# Patient Record
Sex: FEMALE | Race: WHITE | NOT HISPANIC OR LATINO | Employment: UNEMPLOYED | URBAN - METROPOLITAN AREA
[De-identification: names, ages, dates, MRNs, and addresses within clinical notes are randomized per-mention and may not be internally consistent; named-entity substitution may affect disease eponyms.]

---

## 2017-06-25 ENCOUNTER — HOSPITAL ENCOUNTER (EMERGENCY)
Facility: HOSPITAL | Age: 52
Discharge: HOME | End: 2017-06-25
Attending: EMERGENCY MEDICINE
Payer: COMMERCIAL

## 2017-06-25 VITALS
HEART RATE: 84 BPM | BODY MASS INDEX: 31.83 KG/M2 | SYSTOLIC BLOOD PRESSURE: 143 MMHG | RESPIRATION RATE: 20 BRPM | TEMPERATURE: 99 F | WEIGHT: 210 LBS | HEIGHT: 68 IN | OXYGEN SATURATION: 96 % | DIASTOLIC BLOOD PRESSURE: 89 MMHG

## 2017-06-25 DIAGNOSIS — F43.21 ADJUSTMENT DISORDER WITH DEPRESSED MOOD: ICD-10-CM

## 2017-06-25 DIAGNOSIS — F43.29 GRIEF REACTION WITH PROLONGED BEREAVEMENT: Primary | ICD-10-CM

## 2017-06-25 PROCEDURE — 99283 EMERGENCY DEPT VISIT LOW MDM: CPT | Mod: 25

## 2017-06-25 PROCEDURE — 99241 PR OFFICE CONSULTATION,LEVEL I: CPT | Mod: GT,,, | Performed by: PSYCHIATRY & NEUROLOGY

## 2017-06-25 RX ORDER — CLONAZEPAM 1 MG/1
2 TABLET ORAL 2 TIMES DAILY PRN
COMMUNITY
End: 2017-10-03 | Stop reason: SDUPTHER

## 2017-06-25 RX ORDER — SERTRALINE HYDROCHLORIDE 100 MG/1
100 TABLET, FILM COATED ORAL DAILY
COMMUNITY
End: 2017-07-18 | Stop reason: ALTCHOICE

## 2017-06-25 RX ORDER — DEXTROAMPHETAMINE SACCHARATE, AMPHETAMINE ASPARTATE MONOHYDRATE, DEXTROAMPHETAMINE SULFATE AND AMPHETAMINE SULFATE 7.5; 7.5; 7.5; 7.5 MG/1; MG/1; MG/1; MG/1
30 CAPSULE, EXTENDED RELEASE ORAL EVERY MORNING
COMMUNITY
End: 2017-07-18 | Stop reason: SDUPTHER

## 2017-06-25 NOTE — ED PROVIDER NOTES
"Encounter Date: 6/25/2017    SCRIBE #1 NOTE: I, Angelique Esposito , am scribing for, and in the presence of,  Dr. Bush . I have scribed the entire note.       History     Chief Complaint   Patient presents with    Psychiatric Evaluation     " can't stop crying " " my mother passed away on marianna DONI "    Depression         06/25/2017  5:06 PM     Chief Complaint: Psychiatric Evaluation       The patient is a 51 y.o. female with a PMHx of depression and anxiety who is presenting to the ED for psychiatric evaluation and depression. The pt endorses intermittent episodes of severe depression x 6 months s/p the death of her mother. The pt reports that she "cannot stop crying" and "feels like she doesn't want to be in pain any more". The pt confirms that she is currently psychiatric medications but denies having seen by a psychiatrist within the past 6 months. She endorses passive suicidal ideations but denies having a plan. No HI, AH, VH, or delusions. Pt denies drug or alcohol abuse. No physical complaints. No previous psychiatric commitment. No pertinent past surgical hx.           The history is provided by the patient.     Review of patient's allergies indicates:  Allergies no known allergies  Past Medical History:   Diagnosis Date    Depression      Past Surgical History:   Procedure Laterality Date    TONSILLECTOMY       No family history on file.  Social History   Substance Use Topics    Smoking status: Current Every Day Smoker    Smokeless tobacco: Not on file    Alcohol use Not on file     Review of Systems   Constitutional: Negative for fever.   HENT: Negative for sore throat.    Respiratory: Negative for shortness of breath.    Cardiovascular: Negative for chest pain.   Gastrointestinal: Negative for nausea.   Genitourinary: Negative for dysuria.   Musculoskeletal: Negative for back pain.   Skin: Negative for rash.   Neurological: Negative for weakness.   Hematological: Does not bruise/bleed easily. "   Psychiatric/Behavioral: Positive for dysphoric mood and suicidal ideas (passive).       Physical Exam     Initial Vitals [17 1558]   BP Pulse Resp Temp SpO2   (!) 143/89 84 20 98.5 °F (36.9 °C) 96 %      MAP       107         Physical Exam    Nursing note and vitals reviewed.  Constitutional: She appears well-developed and well-nourished.  Non-toxic appearance. No distress.   HENT:   Head: Normocephalic and atraumatic.   Mouth/Throat: Oropharynx is clear and moist.   Eyes: EOM are normal. Pupils are equal, round, and reactive to light.   Neck: Normal range of motion. Neck supple. No neck rigidity. No JVD present.   Cardiovascular: Normal rate, regular rhythm, normal heart sounds and intact distal pulses.   Pulmonary/Chest: Breath sounds normal. She has no wheezes. She has no rhonchi. She has no rales.   Abdominal: Soft. Bowel sounds are normal. She exhibits no distension. There is no tenderness. There is no rigidity, no rebound and no guarding.   Musculoskeletal: Normal range of motion.   Neurological: She is alert and oriented to person, place, and time. She has normal strength and normal reflexes. No cranial nerve deficit or sensory deficit. She exhibits normal muscle tone. Coordination normal. GCS eye subscore is 4. GCS verbal subscore is 5. GCS motor subscore is 6.   Skin: Skin is warm and dry.   Psychiatric: Her speech is normal. She is withdrawn. She is not actively hallucinating. She exhibits a depressed mood.   Tearful. Depressed, cries during examination.          ED Course   Procedures  Labs Reviewed - No data to display          Medical Decision Making:   History:   Old Medical Records: I decided to obtain old medical records.  Initial Assessment:   Patient presents for psychiatric evaluation for feeling depressed and crying for 6 months after her mother .  She is also had other losses such as her sister who passed away prior to that and her child moving out of the house.  She has had passive  suicidal thoughts but no active plan and she states she does not want to kill himself.  I doubt she is actively suicidal.  She is able to perform her ADLs and is not gravely disabled.  I do not think she needs a PEC but obtain psychiatric consult for further evaluation.  Tele psych evaluator patient also agrees patient can follow up with outpatient psychiatry.  Patient will contact her insurance company tomorrow to ask for an in network providers.  Return precautions discussed.  She is discharged improving acute distress with likely prolonged grief reaction.              Scribe Attestation:   Scribe #1: I performed the above scribed service and the documentation accurately describes the services I performed. I attest to the accuracy of the note.    Attending Attestation:           Physician Attestation for Scribe:  Physician Attestation Statement for Scribe #1: I, Dr. Bush , reviewed documentation, as scribed by Angelique Esposito  in my presence, and it is both accurate and complete.                 ED Course     Clinical Impression:   The primary encounter diagnosis was Grief reaction with prolonged bereavement. A diagnosis of Adjustment disorder with depressed mood was also pertinent to this visit.                           Wayne Bush MD  06/25/17 [id number]

## 2017-06-26 NOTE — CONSULTS
"Tele-Consultation to Emergency Department from Psychiatry    Please see previous notes:    Patient agreeable to consultation via telepsychiatry.    Consultation started: 2017 at 0817  The chief complaint leading to psychiatric consultation is: complicated grief  This consultation was requested by Dr. Wayne Bush, the Emergency Department attending physician.  The location of the consulting psychiatrist is [facility].  The patient location is [facility].  The patient arrived at the ED at: not known    Also present with the patient at the time of the consultation: hospital staff    Patient Identification:  Suki Galvan is a 51 y.o. female.    Patient information was obtained from patient.  Patient presented voluntarily to the Emergency Department by private vehicle.    History of Present Illness:  The patient is a 51 year old female who presents to ER on her own after having an increase in tearfulness over the last day. She notes that she is continuing to grieve the death of her mother who  David Izabella. She also grieves the death of her uncle, sister, and father who  prior to her mother. The patient notes that she will feel alone and "abandoned" as she was the caregiver of her mother. She denies any Si, active or passive currently. She notes that she has had some passive SI but has never attempted suicide. She states that she doesn't want to die and would never kill herself as she is scared to die. She also doesn't want to leave her , children and grandchildren. She notes increase in sadness over the last 2 weeks, increase in appetite with an unintentional 10lbs weight gain, depressed mood and increase in tearfulness. She is currently prescribed medications by PCP including Zoloft 100mg PO daily, Klonopin 2mg PO QHS and Adderall 40mg PO daily. She notes having previously seen a therapist, Natalie Collazo and a psychiatrist Dr. Collazo several years ago.     Psychiatric " "History:   Hospitalization: No  Medication Trials: Yes  Suicide Attempts: no  Violence: none   Depression: see HPI  Kimberlyn: none  AH's: none  Delusions: none    Review of Systems:  Negative except for sadness, depressed mood  Past Medical History:   Past Medical History:   Diagnosis Date    Depression         Seizures: none  Head trauma/l.o.c.: none  Wish to become pregnant[if female of childbearing age]: n/a    Allergies: nkda  Review of patient's allergies indicates:  Allergies no known allergies    Medications in ER: Medications - No data to display    Medications at home: Zoloft 100mg pO daily, Klonopin 2mg PO daily, Adderall 40mg PO daily    Substance Abuse History:   Alchohol: not known  Drug: not known     Legal History:   Past charges/incarcerations: none  Pending charges: none    Family Psychiatric History: not known    Social History:   History of Physical/Sexual Abuse: not known  Education: not known    Employment/Disability: unemployed   Financial: supported by   Relationship Status/Sexual Orientation:    Children: has children   Housing Status: lives with   Hinduism: not known   History: none   Recreational Activities: not known  Access to Gun: none    Current Evaluation:     Constitutional  Vitals:  Vitals:    06/25/17 1558   BP: (!) 143/89   Pulse: 84   Resp: 20   Temp: 98.5 °F (36.9 °C)   TempSrc: Oral   SpO2: 96%   Weight: 95.3 kg (210 lb)   Height: 5' 8" (1.727 m)      General:  unremarkable, age appropriate     Musculoskeletal  Muscle Strength/Tone:   moving arms normally   Gait & Station:   sitting on stretcher     Psychiatric  Level of Consciousness: alert  Orientation: oriented to person, place and time  Grooming: in hospital gown  Psychomotor Behavior: no agitation  Speech: normal in rate, rhythm and volume  Language: uses words appropriately  Mood: sad, depressed  Affect: congruent  Thought Process: linear  Associations: goal directed  Thought Content: no Si, " active or passive currently, no HI/AVH  Memory: intact  Attention: intact to interview  Fund of Knowledge: appears adequate  Insight: good   Judgement: good    Relevant Elements of Neurological Exam: no abnormality of posture noted    Assessment - Diagnosis - Goals:     Diagnosis/Impression: The patient presents with a history of depression. She also likely has an adjustment disorder with depessed mood in addition to a history of deprssion as she is having a difficult time processing and dealing with the death of her mother. She denies any current Si, active or passive. She has had some occasional fleeting passive Si. She denies any plan and denies ever making any suicide attempts. She identifies wanting to live for herself and family and notes that she is scared to die. She doesn't appear to be an imminent risk of danger to herself/others. She is not having any HI and thus isn't a risk of danger to others. She is maintaining ADL's, bathing, eating, hygiene, etc and thus she is not gravely disabled. There are no grounds to place her on a PEC. She can be discharged from a psychiatric stand and should have some outpatient follow up. She can benefit both from a psychiatrist but also from a talk therapist as she needs to process her grief feelings.     Rec: 1. Patient doesn't need hospitalization. 2. Can follow outpatient with a psychiatrist and therapist-if she is not already connected, should look to call insurance company to find out who is in network. She should continue to follow with her PCP for psychotropic medications for now.     Time with patient: 15 minutes     More than 50% of the time was spent counseling/coordinating care    Laboratory Data: Labs Reviewed - No data to display      Consulting clinician was informed of the encounter and consult note.    Consultation ended: 6/25/2017 at 9:05pm

## 2017-06-26 NOTE — ED NOTES
Pt crying while talking about all the family members she has lost over the last 6 years. Pt is aware she is waiting for a tele-psych consult.

## 2017-07-18 ENCOUNTER — OFFICE VISIT (OUTPATIENT)
Dept: FAMILY MEDICINE | Facility: CLINIC | Age: 52
End: 2017-07-18
Payer: COMMERCIAL

## 2017-07-18 VITALS
DIASTOLIC BLOOD PRESSURE: 74 MMHG | RESPIRATION RATE: 18 BRPM | HEIGHT: 68 IN | TEMPERATURE: 99 F | BODY MASS INDEX: 31.85 KG/M2 | WEIGHT: 210.13 LBS | HEART RATE: 78 BPM | SYSTOLIC BLOOD PRESSURE: 128 MMHG | OXYGEN SATURATION: 97 %

## 2017-07-18 DIAGNOSIS — F32.89 OTHER DEPRESSION: ICD-10-CM

## 2017-07-18 DIAGNOSIS — R73.9 HYPERGLYCEMIA: Primary | ICD-10-CM

## 2017-07-18 DIAGNOSIS — F17.200 SMOKING: ICD-10-CM

## 2017-07-18 DIAGNOSIS — F43.21 GRIEF REACTION: ICD-10-CM

## 2017-07-18 LAB — GLUCOSE SERPL-MCNC: 93 MG/DL (ref 70–110)

## 2017-07-18 PROCEDURE — 99999 PR PBB SHADOW E&M-EST. PATIENT-LVL IV: CPT | Mod: PBBFAC,,, | Performed by: NURSE PRACTITIONER

## 2017-07-18 PROCEDURE — 99204 OFFICE O/P NEW MOD 45 MIN: CPT | Mod: S$GLB,,, | Performed by: NURSE PRACTITIONER

## 2017-07-18 PROCEDURE — 82948 REAGENT STRIP/BLOOD GLUCOSE: CPT | Mod: S$GLB,,, | Performed by: NURSE PRACTITIONER

## 2017-07-18 RX ORDER — DEXTROAMPHETAMINE SACCHARATE, AMPHETAMINE ASPARTATE MONOHYDRATE, DEXTROAMPHETAMINE SULFATE AND AMPHETAMINE SULFATE 5; 5; 5; 5 MG/1; MG/1; MG/1; MG/1
20 CAPSULE, EXTENDED RELEASE ORAL
COMMUNITY
End: 2017-10-03

## 2017-07-18 RX ORDER — UBIDECARENONE 75 MG
500 CAPSULE ORAL DAILY
COMMUNITY

## 2017-07-18 RX ORDER — SERTRALINE HYDROCHLORIDE 100 MG/1
200 TABLET, FILM COATED ORAL DAILY
COMMUNITY
End: 2017-10-03 | Stop reason: SDUPTHER

## 2017-07-18 NOTE — PROGRESS NOTES
Subjective:       Patient ID: Suki Galvan is a 51 y.o. female.    Chief Complaint: Hyperglycemia (Patient states that she has been monitoring blood sugars at home and reports it is running about 190 (brought home monitoring log at home). Believes that she may have diabetes. )  This is her first time being seen here in the clinic.  HPI   She is her today with complaints of high sugar level.  Vitals:    07/18/17 1339   BP: 128/74   Pulse: 78   Resp: 18   Temp: 98.8 °F (37.1 °C)     Past Medical History:   Diagnosis Date    Anxiety     Arthritis     Depression     28 years    Kidney stones     Multiple thyroid nodules    Below are her home glucose readings        Review of Systems    Psychiatry appt in August 15th on [facility]  Objective:      Physical Exam   Constitutional: She is oriented to person, place, and time. Vital signs are normal. She appears well-developed and well-nourished.   HENT:   Head: Normocephalic and atraumatic.   Right Ear: Hearing, tympanic membrane, external ear and ear canal normal.   Left Ear: Hearing, tympanic membrane, external ear and ear canal normal.   Nose: Nose normal.   Mouth/Throat: Uvula is midline, oropharynx is clear and moist and mucous membranes are normal.   Eyes: Lids are normal.   Neck: Normal range of motion. Neck supple.   Cardiovascular: Normal rate, regular rhythm and normal heart sounds.    Pulmonary/Chest: Effort normal and breath sounds normal.   Abdominal: Soft. Bowel sounds are normal. There is no tenderness.   Musculoskeletal: Normal range of motion.   Lymphadenopathy:        Head (right side): No submental, no submandibular, no tonsillar, no preauricular, no posterior auricular and no occipital adenopathy present.        Head (left side): No submental, no submandibular, no tonsillar, no preauricular, no posterior auricular and no occipital adenopathy present.   Neurological: She is alert and oriented to person, place, and time.   Skin: Skin is warm, dry and  intact.   Psychiatric: She has a normal mood and affect. Her speech is normal and behavior is normal. Judgment and thought content normal. Cognition and memory are normal.   Nursing note and vitals reviewed.      Assessment & Plan:       Hyperglycemia  -     POCT Glucose  -     Hemoglobin A1c; Future; Expected date: 07/18/2017  -     CBC auto differential; Future; Expected date: 07/18/2017  -     Comprehensive metabolic panel; Future; Expected date: 07/18/2017  -     Lipid panel; Future; Expected date: 07/18/2017    Other depression  -     TSH; Future; Expected date: 07/18/2017  -     T3, free; Future; Expected date: 07/18/2017  -     T4, free; Future; Expected date: 07/18/2017  -     CBC auto differential; Future; Expected date: 07/18/2017  -     Comprehensive metabolic panel; Future; Expected date: 07/18/2017  -     Vitamin B12; Future; Expected date: 07/18/2017    Grief reaction  -     Vitamin B12; Future; Expected date: 07/18/2017    Smoking    I have completed her medical, surgical, family and social history      Return if symptoms worsen or fail to improve.

## 2017-07-21 LAB
ALBUMIN SERPL-MCNC: 4 G/DL (ref 3.6–5.1)
ALBUMIN/GLOB SERPL: 1.5 (CALC) (ref 1–2.5)
ALP SERPL-CCNC: 85 U/L (ref 33–130)
ALT SERPL-CCNC: 14 U/L (ref 6–29)
AST SERPL-CCNC: 18 U/L (ref 10–35)
BASOPHILS # BLD AUTO: 48 CELLS/UL (ref 0–200)
BASOPHILS NFR BLD AUTO: 0.7 %
BILIRUB SERPL-MCNC: 0.5 MG/DL (ref 0.2–1.2)
BUN SERPL-MCNC: 13 MG/DL (ref 7–25)
BUN/CREAT SERPL: ABNORMAL (CALC) (ref 6–22)
CALCIUM SERPL-MCNC: 9.5 MG/DL (ref 8.6–10.4)
CHLORIDE SERPL-SCNC: 107 MMOL/L (ref 98–110)
CHOLEST SERPL-MCNC: 247 MG/DL (ref 125–200)
CHOLEST/HDLC SERPL: 3.6 (CALC)
CO2 SERPL-SCNC: 30 MMOL/L (ref 20–31)
CREAT SERPL-MCNC: 0.75 MG/DL (ref 0.5–1.05)
EOSINOPHIL # BLD AUTO: 455 CELLS/UL (ref 15–500)
EOSINOPHIL NFR BLD AUTO: 6.6 %
ERYTHROCYTE [DISTWIDTH] IN BLOOD BY AUTOMATED COUNT: 12.3 % (ref 11–15)
GFR SERPL CREATININE-BSD FRML MDRD: 91 ML/MIN/1.73M2
GLOBULIN SER CALC-MCNC: 2.7 G/DL (CALC) (ref 1.9–3.7)
GLUCOSE SERPL-MCNC: 103 MG/DL (ref 65–99)
HBA1C MFR BLD: 5.6 % OF TOTAL HGB
HCT VFR BLD AUTO: 43.6 % (ref 35–45)
HDLC SERPL-MCNC: 68 MG/DL
HGB BLD-MCNC: 14 G/DL (ref 11.7–15.5)
LDLC SERPL CALC-MCNC: 165 MG/DL (CALC)
LYMPHOCYTES # BLD AUTO: 1670 CELLS/UL (ref 850–3900)
LYMPHOCYTES NFR BLD AUTO: 24.2 %
MCH RBC QN AUTO: 29.2 PG (ref 27–33)
MCHC RBC AUTO-ENTMCNC: 32.1 G/DL (ref 32–36)
MCV RBC AUTO: 90.8 FL (ref 80–100)
MONOCYTES # BLD AUTO: 683 CELLS/UL (ref 200–950)
MONOCYTES NFR BLD AUTO: 9.9 %
NEUTROPHILS # BLD AUTO: 4043 CELLS/UL (ref 1500–7800)
NEUTROPHILS NFR BLD AUTO: 58.6 %
NONHDLC SERPL-MCNC: 179 MG/DL (CALC)
PLATELET # BLD AUTO: 361 THOUSAND/UL (ref 140–400)
PMV BLD REES-ECKER: 10.7 FL (ref 7.5–12.5)
POTASSIUM SERPL-SCNC: 4.9 MMOL/L (ref 3.5–5.3)
PROT SERPL-MCNC: 6.7 G/DL (ref 6.1–8.1)
RBC # BLD AUTO: 4.8 MILLION/UL (ref 3.8–5.1)
SODIUM SERPL-SCNC: 139 MMOL/L (ref 135–146)
T3FREE SERPL-MCNC: 2.9 PG/ML (ref 2.3–4.2)
T4 FREE SERPL-MCNC: 1 NG/DL (ref 0.8–1.8)
TRIGL SERPL-MCNC: 70 MG/DL
TSH SERPL-ACNC: 2.18 MIU/L
VIT B12 SERPL-MCNC: 295 PG/ML (ref 200–1100)
WBC # BLD AUTO: 6.9 THOUSAND/UL (ref 3.8–10.8)

## 2017-07-25 ENCOUNTER — PATIENT MESSAGE (OUTPATIENT)
Dept: FAMILY MEDICINE | Facility: CLINIC | Age: 52
End: 2017-07-25

## 2017-08-14 ENCOUNTER — PATIENT MESSAGE (OUTPATIENT)
Dept: FAMILY MEDICINE | Facility: CLINIC | Age: 52
End: 2017-08-14

## 2017-10-03 ENCOUNTER — OFFICE VISIT (OUTPATIENT)
Dept: PSYCHIATRY | Facility: CLINIC | Age: 52
End: 2017-10-03
Payer: COMMERCIAL

## 2017-10-03 VITALS
WEIGHT: 212 LBS | HEIGHT: 68 IN | DIASTOLIC BLOOD PRESSURE: 70 MMHG | BODY MASS INDEX: 32.13 KG/M2 | HEART RATE: 64 BPM | SYSTOLIC BLOOD PRESSURE: 147 MMHG

## 2017-10-03 DIAGNOSIS — F33.1 MODERATE EPISODE OF RECURRENT MAJOR DEPRESSIVE DISORDER: Primary | ICD-10-CM

## 2017-10-03 DIAGNOSIS — F41.1 GAD (GENERALIZED ANXIETY DISORDER): ICD-10-CM

## 2017-10-03 PROCEDURE — 99999 PR PBB SHADOW E&M-EST. PATIENT-LVL III: CPT | Mod: PBBFAC,,, | Performed by: PSYCHIATRY & NEUROLOGY

## 2017-10-03 PROCEDURE — 99204 OFFICE O/P NEW MOD 45 MIN: CPT | Mod: S$GLB,,, | Performed by: PSYCHIATRY & NEUROLOGY

## 2017-10-03 RX ORDER — DEXTROAMPHETAMINE SACCHARATE, AMPHETAMINE ASPARTATE, DEXTROAMPHETAMINE SULFATE AND AMPHETAMINE SULFATE 5; 5; 5; 5 MG/1; MG/1; MG/1; MG/1
1 TABLET ORAL 2 TIMES DAILY
Qty: 60 TABLET | Refills: 0 | Status: SHIPPED | OUTPATIENT
Start: 2017-10-31 | End: 2018-02-26 | Stop reason: SDUPTHER

## 2017-10-03 RX ORDER — DEXTROAMPHETAMINE SACCHARATE, AMPHETAMINE ASPARTATE, DEXTROAMPHETAMINE SULFATE AND AMPHETAMINE SULFATE 5; 5; 5; 5 MG/1; MG/1; MG/1; MG/1
1 TABLET ORAL 2 TIMES DAILY
Qty: 60 TABLET | Refills: 0 | Status: SHIPPED | OUTPATIENT
Start: 2017-10-03 | End: 2017-10-03 | Stop reason: SDUPTHER

## 2017-10-03 RX ORDER — CLONAZEPAM 2 MG/1
2 TABLET ORAL DAILY PRN
Qty: 30 TABLET | Refills: 2 | Status: SHIPPED | OUTPATIENT
Start: 2017-10-03 | End: 2018-02-26 | Stop reason: SDUPTHER

## 2017-10-03 RX ORDER — SERTRALINE HYDROCHLORIDE 100 MG/1
200 TABLET, FILM COATED ORAL DAILY
Qty: 30 TABLET | Refills: 2 | Status: SHIPPED | OUTPATIENT
Start: 2017-10-03 | End: 2018-05-28 | Stop reason: SDUPTHER
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
## 2017-10-12 ENCOUNTER — DOCUMENTATION ONLY (OUTPATIENT)
Dept: FAMILY MEDICINE | Facility: CLINIC | Age: 52
End: 2017-10-12

## 2017-10-12 NOTE — PROGRESS NOTES
Left several message over the last few days for patient to call and reschedule her appointment on 10-20.  is out of the office.

## 2017-10-26 DIAGNOSIS — Z12.39 SCREENING FOR BREAST CANCER: ICD-10-CM

## 2017-10-26 DIAGNOSIS — Z12.11 COLON CANCER SCREENING: ICD-10-CM

## 2017-10-26 DIAGNOSIS — Z11.59 NEED FOR HEPATITIS C SCREENING TEST: Primary | ICD-10-CM

## 2017-10-27 ENCOUNTER — DOCUMENTATION ONLY (OUTPATIENT)
Dept: FAMILY MEDICINE | Facility: CLINIC | Age: 52
End: 2017-10-27

## 2017-10-30 ENCOUNTER — OFFICE VISIT (OUTPATIENT)
Dept: FAMILY MEDICINE | Facility: CLINIC | Age: 52
End: 2017-10-30
Payer: COMMERCIAL

## 2017-10-30 VITALS
WEIGHT: 214.5 LBS | DIASTOLIC BLOOD PRESSURE: 76 MMHG | HEART RATE: 82 BPM | HEIGHT: 68 IN | SYSTOLIC BLOOD PRESSURE: 130 MMHG | TEMPERATURE: 99 F | BODY MASS INDEX: 32.51 KG/M2

## 2017-10-30 DIAGNOSIS — R73.9 HYPERGLYCEMIA: Primary | ICD-10-CM

## 2017-10-30 DIAGNOSIS — G47.419 PRIMARY NARCOLEPSY WITHOUT CATAPLEXY: ICD-10-CM

## 2017-10-30 DIAGNOSIS — Z12.4 PAP SMEAR FOR CERVICAL CANCER SCREENING: ICD-10-CM

## 2017-10-30 DIAGNOSIS — G47.00 INSOMNIA, UNSPECIFIED TYPE: ICD-10-CM

## 2017-10-30 DIAGNOSIS — E78.5 HYPERLIPIDEMIA, UNSPECIFIED HYPERLIPIDEMIA TYPE: ICD-10-CM

## 2017-10-30 DIAGNOSIS — F17.200 SMOKING: ICD-10-CM

## 2017-10-30 DIAGNOSIS — Z12.11 COLON CANCER SCREENING: ICD-10-CM

## 2017-10-30 DIAGNOSIS — E04.1 THYROID NODULE: ICD-10-CM

## 2017-10-30 DIAGNOSIS — Z12.31 ENCOUNTER FOR SCREENING MAMMOGRAM FOR MALIGNANT NEOPLASM OF BREAST: ICD-10-CM

## 2017-10-30 DIAGNOSIS — F43.21 GRIEF REACTION: ICD-10-CM

## 2017-10-30 DIAGNOSIS — Z11.59 NEED FOR HEPATITIS C SCREENING TEST: ICD-10-CM

## 2017-10-30 DIAGNOSIS — Z83.49 FAMILY HISTORY OF HASHIMOTO THYROIDITIS: ICD-10-CM

## 2017-10-30 PROCEDURE — 88175 CYTOPATH C/V AUTO FLUID REDO: CPT

## 2017-10-30 PROCEDURE — 99214 OFFICE O/P EST MOD 30 MIN: CPT | Mod: S$GLB,,, | Performed by: FAMILY MEDICINE

## 2017-10-30 PROCEDURE — 99999 PR PBB SHADOW E&M-EST. PATIENT-LVL IV: CPT | Mod: PBBFAC,,, | Performed by: FAMILY MEDICINE

## 2017-10-30 RX ORDER — TRAZODONE HYDROCHLORIDE 50 MG/1
50 TABLET ORAL NIGHTLY
Qty: 30 TABLET | Refills: 5 | Status: SHIPPED | OUTPATIENT
Start: 2017-10-30 | End: 2018-04-03 | Stop reason: SDUPTHER

## 2017-11-01 ENCOUNTER — HOSPITAL ENCOUNTER (OUTPATIENT)
Dept: RADIOLOGY | Facility: CLINIC | Age: 52
Discharge: HOME | End: 2017-11-01
Attending: FAMILY MEDICINE
Payer: COMMERCIAL

## 2017-11-01 DIAGNOSIS — Z12.31 ENCOUNTER FOR SCREENING MAMMOGRAM FOR MALIGNANT NEOPLASM OF BREAST: ICD-10-CM

## 2017-11-01 PROCEDURE — 76536 US EXAM OF HEAD AND NECK: CPT | Mod: 26,,, | Performed by: RADIOLOGY

## 2017-11-01 PROCEDURE — 76536 US EXAM OF HEAD AND NECK: CPT | Mod: TC,PO

## 2017-11-01 NOTE — PROGRESS NOTES
Subjective:       Patient ID: Suki Galvan is a 52 y.o. female.    Chief Complaint: Establish Care    Patient Active Problem List   Diagnosis    Adjustment disorder with depressed mood    CASSIE (generalized anxiety disorder)    Moderate episode of recurrent major depressive disorder    Hyperlipidemia    Hyperglycemia    Smoking    Grief reaction    Narcolepsy   H/o thyroid nodules  By u/s but no f/u in years.  Has family h/o hashimoto    Takes adderall for narcolepsy diagnosed 10 years ago.  Also helps depression sx.  Under care of psych.  Has insomnia for which she takes klonopin.  Grief reaction prolonged  HPI  Review of Systems   Constitutional: Positive for activity change and unexpected weight change.   HENT: Negative for hearing loss, rhinorrhea and trouble swallowing.    Eyes: Negative for discharge and visual disturbance.   Respiratory: Negative for chest tightness and wheezing.    Cardiovascular: Negative for chest pain and palpitations.   Gastrointestinal: Positive for constipation. Negative for blood in stool, diarrhea and vomiting.   Endocrine: Negative for polydipsia and polyuria.   Genitourinary: Negative for difficulty urinating, dysuria, hematuria and menstrual problem.   Musculoskeletal: Positive for arthralgias. Negative for joint swelling and neck pain.   Neurological: Positive for weakness and headaches.   Psychiatric/Behavioral: Positive for dysphoric mood. Negative for confusion.       Objective:      Physical Exam   Constitutional: She is oriented to person, place, and time. She appears well-developed and well-nourished.   Neck: No thyromegaly present.   Cardiovascular: Normal rate, regular rhythm and normal heart sounds.    Pulmonary/Chest: Effort normal and breath sounds normal. Right breast exhibits no inverted nipple, no mass, no nipple discharge, no skin change and no tenderness. Left breast exhibits no inverted nipple, no mass, no nipple discharge, no skin change and no  tenderness. Breasts are symmetrical. There is no breast swelling.   Abdominal: Soft. Bowel sounds are normal. She exhibits no distension. There is no tenderness.   Genitourinary: Vagina normal and uterus normal. No breast tenderness, discharge or bleeding. Pelvic exam was performed with patient supine. No labial fusion. There is no rash, tenderness, lesion or injury on the right labia. There is no rash, tenderness, lesion or injury on the left labia. Cervix exhibits no motion tenderness, no discharge and no friability. Right adnexum displays no mass, no tenderness and no fullness. Left adnexum displays no mass, no tenderness and no fullness. No vaginal discharge found.   Musculoskeletal: She exhibits no edema.   Neurological: She is alert and oriented to person, place, and time.   Skin: Skin is warm and dry.   Psychiatric: She has a normal mood and affect.   Nursing note and vitals reviewed.      Assessment:       1. Hyperglycemia    2. Grief reaction    3. Smoking    4. Hyperlipidemia, unspecified hyperlipidemia type    5. Family history of Hashimoto thyroiditis    6. Thyroid nodule    7. Encounter for screening mammogram for malignant neoplasm of breast    8. Pap smear for cervical cancer screening    9. Need for hepatitis C screening test    10. Insomnia, unspecified type    11. Colon cancer screening    12. Primary narcolepsy without cataplexy        Plan:       1. Hyperglycemia  Screen and treat as indicated:      2. Grief reaction  Cont psych care and meds    3. Smoking  Patient counseled on smoking cessation. I discussed options such as nicotine replacement products, wellbutrin, and chantix.  Side effects, benefits and risks were discussed regarding each.  Printed materials were given.  I offered a referral to [facility] smoking cessation program.  All questions were answered.      4. Hyperlipidemia, unspecified hyperlipidemia type  Stable condition.  Continue current medications.  Will adjust based on lab  findings or if condition changes.    - Lipid panel; Future  - Comprehensive metabolic panel; Future  - Lipid panel  - Comprehensive metabolic panel    5. Family history of Hashimoto thyroiditis  Screen and treat as indicated:    - THYROID PEROXIDASE ANTIBODY; Future  - THYROID STIMULATING IMMUNOGLOBULIN; Future  - THYROID PEROXIDASE ANTIBODY  - THYROID STIMULATING IMMUNOGLOBULIN    6. Thyroid nodule  Screen and treat as indicated:    - TSH; Future  - T3, free; Future  - T4, free; Future  - US Soft Tissue Head Neck Thyroid; Future  - TSH  - T3, free  - T4, free  - US Soft Tissue Head Neck Thyroid    7. Encounter for screening mammogram for malignant neoplasm of breast  Screen and treat as indicated:    - Mammo Digital Screening Bilateral With CAD; Future    8. Pap smear for cervical cancer screening  Screen and treat as indicated:    - Liquid-based pap smear, screening    9. Need for hepatitis C screening test  Screen and treat as indicated:    - Hepatitis C antibody; Future  - Hepatitis C antibody    10. Insomnia, unspecified type  Patient was counseled on improving insomnia with these home care guidelines:  1. Review your medicines with your doctor or pharmacist to find out if they can cause insomnia.  2. Caffeine, smoking and alcohol also affect sleep. Limit your daily use and do not use these before bedtime. Alcohol may make you sleepy at first, but as its effects wear off, you may awaken a few hours later and have trouble returning to sleep.  3. Do not exercise, eat or drink large amounts of liquid within 2 hours of your bedtime.  4. Improve your sleep habits. Have a fixed bed and wake-up time. Try to keep noise, light and heat in your bedroom at a comfortable level. Try using earplugs or eyeshades if needed.   5. Avoid watching TV in bed.  6. If you do not fall asleep within 30 minutes, try to relax by reading or listening to soft music.  7. Limit daytime napping to one 30 minute period, early in the  day.  8. Get regular exercise. Find other ways to lessen your stress level.  9. If a medicine was prescribed to help reset your sleep patterns, take it as directed. Sleeping pills are intended for short-term use, only. If taken for too long, the effect wears off while the risk of physical addiction and psychological dependence increases.    Add:  - traZODone (DESYREL) 50 MG tablet; Take 1 tablet (50 mg total) by mouth every evening.  Dispense: 30 tablet; Refill: 5  Wean klonopin to 1/2 2mg po qhs prn and wean down more as able    11. Colon cancer screening  Refer for screening colonoscopy  - Ambulatory referral to Gastroenterology    12. Primary narcolepsy without cataplexy  Controlled on current medications.  Continue current medications.    Reeval 3 months or sooner prn

## 2017-11-04 LAB
ALBUMIN SERPL-MCNC: 4.1 G/DL (ref 3.6–5.1)
ALBUMIN/GLOB SERPL: 1.5 (CALC) (ref 1–2.5)
ALP SERPL-CCNC: 95 U/L (ref 33–130)
ALT SERPL-CCNC: 12 U/L (ref 6–29)
AST SERPL-CCNC: 15 U/L (ref 10–35)
BILIRUB SERPL-MCNC: 0.4 MG/DL (ref 0.2–1.2)
BUN SERPL-MCNC: 14 MG/DL (ref 7–25)
BUN/CREAT SERPL: ABNORMAL (CALC) (ref 6–22)
CALCIUM SERPL-MCNC: 9.2 MG/DL (ref 8.6–10.4)
CHLORIDE SERPL-SCNC: 104 MMOL/L (ref 98–110)
CHOLEST SERPL-MCNC: 259 MG/DL
CHOLEST/HDLC SERPL: 3.6 (CALC)
CO2 SERPL-SCNC: 27 MMOL/L (ref 20–31)
CREAT SERPL-MCNC: 0.79 MG/DL (ref 0.5–1.05)
GFR SERPL CREATININE-BSD FRML MDRD: 85 ML/MIN/1.73M2
GLOBULIN SER CALC-MCNC: 2.8 G/DL (CALC) (ref 1.9–3.7)
GLUCOSE SERPL-MCNC: 101 MG/DL (ref 65–99)
HCV AB S/CO SERPL IA: 0.02
HCV AB SERPL QL IA: NORMAL
HDLC SERPL-MCNC: 71 MG/DL
LDLC SERPL CALC-MCNC: 168 MG/DL (CALC)
NONHDLC SERPL-MCNC: 188 MG/DL (CALC)
POTASSIUM SERPL-SCNC: 4.4 MMOL/L (ref 3.5–5.3)
PROT SERPL-MCNC: 6.9 G/DL (ref 6.1–8.1)
SODIUM SERPL-SCNC: 139 MMOL/L (ref 135–146)
T3FREE SERPL-MCNC: 2.6 PG/ML (ref 2.3–4.2)
T4 FREE SERPL-MCNC: 0.9 NG/DL (ref 0.8–1.8)
THYROPEROXIDASE AB SERPL-ACNC: 1 IU/ML
TRIGL SERPL-MCNC: 93 MG/DL
TSH SERPL-ACNC: 2.46 MIU/L
TSI SER-ACNC: <89 % BASELINE

## 2017-11-07 ENCOUNTER — TELEPHONE (OUTPATIENT)
Dept: FAMILY MEDICINE | Facility: CLINIC | Age: 52
End: 2017-11-07

## 2017-11-07 ENCOUNTER — PATIENT MESSAGE (OUTPATIENT)
Dept: FAMILY MEDICINE | Facility: CLINIC | Age: 52
End: 2017-11-07

## 2017-11-10 NOTE — TELEPHONE ENCOUNTER
Your labs are all essentially in the normal range including: thyroid, liver, kidney and blood sugar.  Your hepatitis C screening is negative.  Your thyroid antibody test is negative (so no evidence of Hashimoto's autoimmune thyroiditis).        Your cholesterol is high.  Here are the results of the last cholesterol checks:   Lab Results   Component Value Date    CHOL 259 (H) 11/02/2017    CHOL 247 (H) 07/20/2017     Lab Results   Component Value Date    HDL 71 11/02/2017    HDL 68 07/20/2017     Lab Results   Component Value Date    LDLCALC 168 (H) 11/02/2017    LDLCALC 165 (H) 07/20/2017     Lab Results   Component Value Date    TRIG 93 11/02/2017    TRIG 70 07/20/2017     Lab Results   Component Value Date    CHOLHDL 3.6 11/02/2017    CHOLHDL 3.6 07/20/2017       Your 10 year risk of having a heart attack or a stroke is low:The 10-year ASCVD risk score (Korey SAMANIEGO Jr., et al., 2013) is: 4.8%    Values used to calculate the score:      Age: 52 years      Sex: Female      Is Non- : No      Diabetic: No      Tobacco smoker: Yes      Systolic Blood Pressure: 130 mmHg      Is BP treated: No      HDL Cholesterol: 71 mg/dL      Total Cholesterol: 259 mg/dL    I recommend a heart healthy diet rich in fiber, fresh vegetables and fruit and low in saturated fats (fried foods, red meat, etc.).  I also recommend regular exercise including a minimum of 150 minutes of cardio exercise per week and 2-30 minute workouts of strength training like light weights, yoga, pilates, etc.  You should work toward a body mass index of < 25.

## 2017-11-13 ENCOUNTER — PATIENT MESSAGE (OUTPATIENT)
Dept: FAMILY MEDICINE | Facility: CLINIC | Age: 52
End: 2017-11-13

## 2017-11-27 ENCOUNTER — PATIENT MESSAGE (OUTPATIENT)
Dept: FAMILY MEDICINE | Facility: CLINIC | Age: 52
End: 2017-11-27

## 2017-11-27 DIAGNOSIS — E66.09 CLASS 1 OBESITY DUE TO EXCESS CALORIES WITH SERIOUS COMORBIDITY AND BODY MASS INDEX (BMI) OF 32.0 TO 32.9 IN ADULT: Chronic | ICD-10-CM

## 2017-11-27 DIAGNOSIS — R73.9 HYPERGLYCEMIA: Primary | ICD-10-CM

## 2017-11-27 DIAGNOSIS — E78.2 MIXED HYPERLIPIDEMIA: ICD-10-CM

## 2017-11-27 DIAGNOSIS — E04.2 MULTIPLE THYROID NODULES: Chronic | ICD-10-CM

## 2017-11-27 PROBLEM — E66.811 CLASS 1 OBESITY DUE TO EXCESS CALORIES WITH SERIOUS COMORBIDITY AND BODY MASS INDEX (BMI) OF 32.0 TO 32.9 IN ADULT: Chronic | Status: ACTIVE | Noted: 2017-11-27

## 2017-11-28 NOTE — PROGRESS NOTES
STAFF NOTE:  Patient's case was formally presented to me by Dr. Garrido and patient was seen by me in supervision on the same day.  I agree with his assessment and plan as specified to try Zoloft with Adderall for management of depressive Sx, as well as use of Klonopin prn for anxiety.

## 2018-02-07 ENCOUNTER — PATIENT MESSAGE (OUTPATIENT)
Dept: FAMILY MEDICINE | Facility: CLINIC | Age: 53
End: 2018-02-07

## 2018-02-23 ENCOUNTER — DOCUMENTATION ONLY (OUTPATIENT)
Dept: FAMILY MEDICINE | Facility: CLINIC | Age: 53
End: 2018-02-23

## 2018-02-23 NOTE — PROGRESS NOTES
Pre-Visit Chart Review  For Appointment Scheduled on 2-26-18    Health Maintenance Due   Topic Date Due    Mammogram  09/30/2003    Colonoscopy  09/30/2013

## 2018-02-26 ENCOUNTER — OFFICE VISIT (OUTPATIENT)
Dept: FAMILY MEDICINE | Facility: CLINIC | Age: 53
End: 2018-02-26
Payer: COMMERCIAL

## 2018-02-26 ENCOUNTER — HOSPITAL ENCOUNTER (OUTPATIENT)
Dept: RADIOLOGY | Facility: CLINIC | Age: 53
Discharge: HOME | End: 2018-02-26
Attending: FAMILY MEDICINE
Payer: COMMERCIAL

## 2018-02-26 VITALS
WEIGHT: 216.25 LBS | DIASTOLIC BLOOD PRESSURE: 80 MMHG | TEMPERATURE: 99 F | HEART RATE: 65 BPM | BODY MASS INDEX: 32.77 KG/M2 | HEIGHT: 68 IN | SYSTOLIC BLOOD PRESSURE: 112 MMHG

## 2018-02-26 DIAGNOSIS — Z12.11 COLON CANCER SCREENING: ICD-10-CM

## 2018-02-26 DIAGNOSIS — M79.645 CHRONIC PAIN OF LEFT THUMB: ICD-10-CM

## 2018-02-26 DIAGNOSIS — Z12.31 ENCOUNTER FOR SCREENING MAMMOGRAM FOR MALIGNANT NEOPLASM OF BREAST: ICD-10-CM

## 2018-02-26 DIAGNOSIS — G47.419 PRIMARY NARCOLEPSY WITHOUT CATAPLEXY: Primary | ICD-10-CM

## 2018-02-26 DIAGNOSIS — E66.09 CLASS 1 OBESITY DUE TO EXCESS CALORIES WITH SERIOUS COMORBIDITY AND BODY MASS INDEX (BMI) OF 32.0 TO 32.9 IN ADULT: Chronic | ICD-10-CM

## 2018-02-26 DIAGNOSIS — J45.20 MILD INTERMITTENT ASTHMA WITHOUT COMPLICATION: ICD-10-CM

## 2018-02-26 DIAGNOSIS — F33.1 MODERATE EPISODE OF RECURRENT MAJOR DEPRESSIVE DISORDER: ICD-10-CM

## 2018-02-26 DIAGNOSIS — Z72.0 TOBACCO ABUSE: ICD-10-CM

## 2018-02-26 DIAGNOSIS — G89.29 CHRONIC PAIN OF LEFT THUMB: ICD-10-CM

## 2018-02-26 DIAGNOSIS — J30.1 SEASONAL ALLERGIC RHINITIS DUE TO POLLEN, UNSPECIFIED CHRONICITY: ICD-10-CM

## 2018-02-26 DIAGNOSIS — F41.1 GAD (GENERALIZED ANXIETY DISORDER): ICD-10-CM

## 2018-02-26 PROCEDURE — 3008F BODY MASS INDEX DOCD: CPT | Mod: S$GLB,,, | Performed by: FAMILY MEDICINE

## 2018-02-26 PROCEDURE — 99999 PR PBB SHADOW E&M-EST. PATIENT-LVL V: CPT | Mod: PBBFAC,,, | Performed by: FAMILY MEDICINE

## 2018-02-26 PROCEDURE — 99214 OFFICE O/P EST MOD 30 MIN: CPT | Mod: S$GLB,,, | Performed by: FAMILY MEDICINE

## 2018-02-26 PROCEDURE — 80307 DRUG TEST PRSMV CHEM ANLYZR: CPT

## 2018-02-26 PROCEDURE — 73130 X-RAY EXAM OF HAND: CPT | Mod: TC,FY,PO,LT

## 2018-02-26 PROCEDURE — 73130 X-RAY EXAM OF HAND: CPT | Mod: 26,LT,S$GLB, | Performed by: RADIOLOGY

## 2018-02-26 RX ORDER — MONTELUKAST SODIUM 10 MG/1
10 TABLET ORAL NIGHTLY
Qty: 30 TABLET | Refills: 11 | Status: SHIPPED | OUTPATIENT
Start: 2018-02-26 | End: 2018-03-28

## 2018-02-26 RX ORDER — ALBUTEROL SULFATE 90 UG/1
2 AEROSOL, METERED RESPIRATORY (INHALATION) EVERY 6 HOURS PRN
Qty: 18 G | Status: SHIPPED | OUTPATIENT
Start: 2018-02-26 | End: 2020-11-07 | Stop reason: SDUPTHER

## 2018-02-26 RX ORDER — CLONAZEPAM 2 MG/1
2 TABLET ORAL DAILY PRN
Qty: 30 TABLET | Refills: 2 | Status: SHIPPED | OUTPATIENT
Start: 2018-02-26 | End: 2020-04-30

## 2018-02-26 RX ORDER — DEXTROAMPHETAMINE SACCHARATE, AMPHETAMINE ASPARTATE, DEXTROAMPHETAMINE SULFATE AND AMPHETAMINE SULFATE 5; 5; 5; 5 MG/1; MG/1; MG/1; MG/1
1 TABLET ORAL 2 TIMES DAILY
Qty: 60 TABLET | Refills: 0 | Status: SHIPPED | OUTPATIENT
Start: 2018-02-26 | End: 2018-04-19 | Stop reason: SDUPTHER

## 2018-02-26 NOTE — LETTER
"2018    Suki Galvan  [address] [place] [address]             [place] [id number] [facility] [address]  [place] [address]  Phone: [phone]  Fax: [phone]   2018    Re: Suki Galvan        : 1965        MRN: [id number]    ADHD medication    My doctor and I have decided that as part of my treatment for ADHD, I will receive prescriptions for controlled substances.  As a patient, I will agree to the following terms in order for my provider to effectively treat my ADHD and also comply with the rules set forth by [facility] Board of Medical Examiners and Drug Enforcement Agency.  1. I understand that in order for me to receive the best possible care, my pain management doctor needs a copy of any previous medical records, including MRI, office notes, lab results, etc.  2. I will provide a full list of my medications, current dose, and how often I take my medication.  3. A single physician shall be responsible for prescribing my pain medication.  4. I understand that my physician may require an office visit every 3 months for certain controlled substances.  5. It is my responsibility to keep my appointments.  If I miss my schedule appointment, I will be rescheduled to the first available time slot.  I understand that pain medications may not be refilled until seen.  6. If my doctor agrees to refill my pain medication by telephone, I will call the office at least 5 business days in advance to request a refill prescription.  7. Refill prescriptions will not be written in the evenings, weekends, or on holidays.  8. Each prescription is expected to last at least one month.  Refills will not be given early if I "run out early", "lose a prescription", "spill" or "misplaced" my medication.  9. It may be necessary for prescriptions to be picked up in person and proof of identification may be required.  10. I will have my pain medication filled at only one " pharmacy.                 [facility] [id number] [facility] - [place] - [address]  [address] [place] [address]  Phone: [phone] Fax: [phone]    [id number] - [place] - [address]  [address]  [place] [address]  Phone: [phone] Fax: [phone]         11. A baseline drug screen may be completed on my first visit and or randomly at other routine clinic visits.      I have read and understand the above information.  I will, to the best of my ability, adhere to these policies and commitments.  I further understand that noncompliance with these policies may result in my being discharged as the patient.      _____________________                     _____Suki Galvan______  Patient signature/date         Patient printed  name                                                                                  _____________________                    ____________________  Physician signature/printed name/date         Witness/date    aNrda Shipman MD 2/26/2018                BEHAVIOR AGREEMENT FOR THE USE OF CONTROLLED DRUGS  The following has been explained to me:  1. It is possible that I may become physically dependent, psychologically dependent, tolerant and/or addicted to controlled substance medications.   2. Physical dependence occurs if withdrawal symptoms are experienced when the drug is suddenly discontinued.  3. Tolerance is the need for higher doses of the drug to achieve the same amount of pain control.  4. Addition is a psychological and behavioral syndrome that is recognized when the patient abuses the drug to obtain mental numbness or euphoria (get high), or shows drug craving behavior or manipulative attitude toward the physician in order to obtain the drug.  5. Withdrawal symptoms may occur if pain medication is stopped abruptly.   These symptoms include yawning, sweating, watery eyes, runny nose, anxiety, tremors, achy muscles, hot and cold flashes,  ""gooseflesh ", abdominal cramps or diarrhea.  6. I will not cut or chew long-acting pain medication.  7. If severe sedation (sleepiness) or any other medical emergency relating to my pain medication occurs, I will contact my doctor's office or seek ER attention immediately.  8. I will not combine these drugs with alcohol or recreational drugs (this includes marijuana).     9. I must inform my doctor if I am taking any other sedating drugs such as Valium, Ativan, seizure medication or psychiatric drugs.    10. I will inform my physician of any current or prior history of drug abuse or prescription medication misuse.  11. These medications may be harmful to an unborn child.  I have been advised to use 2 forms of birth control (at least one barrier, such as condoms) while using these medications.    12. If I test positive for drugs that my doctor has not prescribed, and/or if I refuses a random drug test, my physician has the right to stop my controlled substance, end  his/her relationship with me, and I may be terminated from the clinic.   13. If at any time I become violent or abusive, verbally or physically, my actions will be considered cause to terminate care from the clinic and discontinuation of pain medications.          I understand and agree that if I fail to abide by the above agreements, or if I show signs suspicious of narcotic over use or abuse, my pain management physician may discontinue treatment, and narcotic prescriptions will be discontinued.            _____________________                     _____Suki Galvan______  Patient signature/date          Patient printed  name                                                                                _____________________                    ____________________  Physician signature/printed name/date           Witness/date    Narda Shipman MD 2/26/2018             "

## 2018-02-26 NOTE — PROGRESS NOTES
Subjective:       Patient ID: Suki Galvan is a 52 y.o. female.    Chief Complaint: Follow-up    Patient Active Problem List   Diagnosis    Adjustment disorder with depressed mood    CASSIE (generalized anxiety disorder)    Moderate episode of recurrent major depressive disorder    Hyperlipidemia    Hyperglycemia    Smoking    Grief reaction    Narcolepsy-on chronic stimulants uds and contract 2/26/18    Multiple thyroid nodules    Class 1 obesity due to excess calories with serious comorbidity and body mass index (BMI) of 32.0 to 32.9 in adult   C/o pain in left thumb for over a year. No injury. Gradual.  Has stopped refinishing furniture due to pain.  Seen in UC and told she had tenosynovitis and told to see ortho    Narcolepsy follow-up  Current medication:  Adderall 20mg bid   Supply:  30 day supply  Side effects: none  Benefits:improved focus/concentration, less distraction, easier to complete/stay on task(s), noticeable improvement at work/school/home, more organized and better self-esteem  Medication holidays:yes  DPS checked:yes no evidence of diversion today  UDS: today  ADHD contract signed: today      C/o seasonal allergies and wheezing evin at night.  Uses albuterol prn.    HPI  Review of Systems   Constitutional: Negative for activity change and unexpected weight change.   HENT: Negative for hearing loss, rhinorrhea and trouble swallowing.    Eyes: Negative for discharge and visual disturbance.   Respiratory: Positive for wheezing. Negative for chest tightness.    Cardiovascular: Positive for palpitations. Negative for chest pain.   Gastrointestinal: Negative for blood in stool, constipation, diarrhea and vomiting.   Endocrine: Negative for polydipsia and polyuria.   Genitourinary: Negative for difficulty urinating, dysuria, hematuria and menstrual problem.   Musculoskeletal: Positive for arthralgias and joint swelling. Negative for neck pain.   Neurological: Positive for headaches. Negative  for weakness.   Psychiatric/Behavioral: Negative for confusion and dysphoric mood.       Objective:      Physical Exam   Constitutional: She is oriented to person, place, and time. She appears well-developed and well-nourished.   Cardiovascular: Normal rate, regular rhythm and normal heart sounds.    Pulmonary/Chest: Effort normal and breath sounds normal.   Musculoskeletal: She exhibits no edema.        Left wrist: She exhibits tenderness. She exhibits normal range of motion, no bony tenderness, no swelling, no effusion, no crepitus, no deformity and no laceration.        Left hand: She exhibits tenderness. She exhibits normal range of motion, normal capillary refill and no deformity.   Neg finkelsteins, tender, hypertrophic 1st mcp and cmc joints   Neurological: She is alert and oriented to person, place, and time.   Skin: Skin is warm and dry.   Psychiatric: She has a normal mood and affect.   Nursing note and vitals reviewed.      Assessment:       1. Primary narcolepsy without cataplexy    2. Encounter for screening mammogram for malignant neoplasm of breast    3. Colon cancer screening    4. CASSIE (generalized anxiety disorder)    5. Moderate episode of recurrent major depressive disorder    6. Seasonal allergic rhinitis due to pollen, unspecified chronicity    7. Mild intermittent asthma without complication    8. Tobacco abuse    9. Chronic pain of left thumb    10. Class 1 obesity due to excess calories with serious comorbidity and body mass index (BMI) of 32.0 to 32.9 in adult        Plan:       1. Primary narcolepsy without cataplexy  Controlled on current medications.  Continue current medications.  Discussed risks, alternatives and benefits to the medication.  Discussed side effects including the risks for addiction/dependency, nausea, stomach pain, palpitations, weight loss/loss of appetite, insomnia, elevated heart rate or blood pressure, headaches, anxiety/agitation, worsening of underlying psychiatric  conditions. Patient and/or parent elected to proceed with treatment.  I prescribed a 1 month(s) prescription and the patient will follow- up in clinic in 3 month(s).  Patient/parent was cautioned to go to the emergency room for chest pain, severe headache, fainting, etc.  Discontinue medications if intolerable side effects of if ineffective.  All questions were answered.      - TOXICOLOGY SCREEN, URINE, RANDOM (COMPLIANCE)    2. Encounter for screening mammogram for malignant neoplasm of breast  Screen and treat as indicated:    - Mammo Digital Screening Bilateral With CAD; Future    3. Colon cancer screening  Refer for colonoscopy screening  - Ambulatory referral to Gastroenterology    4. CASSIE (generalized anxiety disorder)  Controlled on current medications.  Continue current medications.  Cont psych care  - clonazePAM (KLONOPIN) 2 MG Tab; Take 1 tablet (2 mg total) by mouth daily as needed.  Dispense: 30 tablet; Refill: 2    5. Moderate episode of recurrent major depressive disorder  Cont current meds and psych care    6. Seasonal allergic rhinitis due to pollen, unspecified chronicity  Recommend otc non-sedating antihistamine such as Loratadine and/or steroid nasal spray such as Flonase as directed and as needed.  Please return to clinic if symptoms persist after these interventions.  Add  - montelukast (SINGULAIR) 10 mg tablet; Take 1 tablet (10 mg total) by mouth every evening.  Dispense: 30 tablet; Refill: 11    7. Mild intermittent asthma without complication  Use prn  - albuterol 90 mcg/actuation inhaler; Inhale 2 puffs into the lungs every 6 (six) hours as needed for Wheezing. Rescue  Dispense: 18 g; Refill: prn    8. Tobacco abuse  Patient counseled on smoking cessation. I discussed options such as nicotine replacement products, wellbutrin, and chantix.  Side effects, benefits and risks were discussed regarding each.  Printed materials were given.  I [facility].  " All questions were answered.    - Ambulatory referral to Smoking Cessation Program    9. Chronic pain of left thumb  Suspect OA rather than tenosynovitis. Rice. Refer for eval and treat  - Ambulatory referral to Orthopedics  - X-Ray Hand Complete Left; Future    10. Class 1 obesity due to excess calories with serious comorbidity and body mass index (BMI) of 32.0 to 32.9 in adult  Counseled patient on his ideal body weight, health consequences of being obese and current recommendations including weekly exercise and a heart healthy diet.  Current BMI is:Estimated body mass index is 32.88 kg/m² as calculated from the following:    Height as of this encounter: 5' 8" (1.727 m).    Weight as of this encounter: 98.1 kg (216 lb 4.3 oz)..  Patient is aware that ideal BMI < 25 or Weight in (lb) to have BMI = 25: 164.1.    Reeval 3 months or sooner prn    "

## 2018-02-27 ENCOUNTER — PATIENT MESSAGE (OUTPATIENT)
Dept: FAMILY MEDICINE | Facility: CLINIC | Age: 53
End: 2018-02-27

## 2018-02-27 LAB
AMPHET+METHAMPHET UR QL: NEGATIVE
BARBITURATES UR QL SCN>200 NG/ML: NEGATIVE
BENZODIAZ UR QL SCN>200 NG/ML: NEGATIVE
BZE UR QL SCN: NEGATIVE
CANNABINOIDS UR QL SCN: NEGATIVE
CREAT UR-MCNC: 70 MG/DL
ETHANOL UR-MCNC: <10 MG/DL
METHADONE UR QL SCN>300 NG/ML: NEGATIVE
OPIATES UR QL SCN: NEGATIVE
PCP UR QL SCN>25 NG/ML: NEGATIVE
TOXICOLOGY INFORMATION: NORMAL

## 2018-04-03 DIAGNOSIS — G47.00 INSOMNIA, UNSPECIFIED TYPE: ICD-10-CM

## 2018-04-03 RX ORDER — TRAZODONE HYDROCHLORIDE 50 MG/1
TABLET ORAL
Qty: 30 TABLET | Refills: 2 | Status: SHIPPED | OUTPATIENT
Start: 2018-04-03

## 2018-04-18 ENCOUNTER — PATIENT MESSAGE (OUTPATIENT)
Dept: FAMILY MEDICINE | Facility: CLINIC | Age: 53
End: 2018-04-18

## 2018-04-19 DIAGNOSIS — F33.1 MODERATE EPISODE OF RECURRENT MAJOR DEPRESSIVE DISORDER: ICD-10-CM

## 2018-04-19 RX ORDER — DEXTROAMPHETAMINE SACCHARATE, AMPHETAMINE ASPARTATE, DEXTROAMPHETAMINE SULFATE AND AMPHETAMINE SULFATE 5; 5; 5; 5 MG/1; MG/1; MG/1; MG/1
1 TABLET ORAL 2 TIMES DAILY
Qty: 60 TABLET | Refills: 0 | Status: SHIPPED | OUTPATIENT
Start: 2018-04-19

## 2018-05-28 ENCOUNTER — PATIENT MESSAGE (OUTPATIENT)
Dept: FAMILY MEDICINE | Facility: CLINIC | Age: 53
End: 2018-05-28

## 2018-05-28 DIAGNOSIS — F33.1 MODERATE EPISODE OF RECURRENT MAJOR DEPRESSIVE DISORDER: ICD-10-CM

## 2018-05-28 DIAGNOSIS — F41.1 GAD (GENERALIZED ANXIETY DISORDER): ICD-10-CM

## 2018-05-28 RX ORDER — SERTRALINE HYDROCHLORIDE 100 MG/1
200 TABLET, FILM COATED ORAL DAILY
Qty: 60 TABLET | Refills: 2 | OUTPATIENT
Start: 2018-05-28 | End: 2020-04-30 | Stop reason: SDUPTHER

## 2018-05-28 RX ORDER — SERTRALINE HYDROCHLORIDE 100 MG/1
200 TABLET, FILM COATED ORAL DAILY
Qty: 30 TABLET | Refills: 5 | Status: SHIPPED | OUTPATIENT
Start: 2018-05-28

## 2018-05-28 NOTE — TELEPHONE ENCOUNTER
Pt is requesting medication refill on Zoloft 100 mg  Last refill: 10/3/17  Last visit:  2/26/18  Follow Up: 8/24/18

## 2018-08-17 DIAGNOSIS — Z12.11 COLON CANCER SCREENING: Primary | ICD-10-CM

## 2018-08-22 ENCOUNTER — PATIENT MESSAGE (OUTPATIENT)
Dept: FAMILY MEDICINE | Facility: CLINIC | Age: 53
End: 2018-08-22

## 2018-08-22 ENCOUNTER — TELEPHONE (OUTPATIENT)
Dept: FAMILY MEDICINE | Facility: CLINIC | Age: 53
End: 2018-08-22

## 2018-11-16 ENCOUNTER — PATIENT MESSAGE (OUTPATIENT)
Dept: ADMINISTRATIVE | Facility: HOSPITAL | Age: 53
End: 2018-11-16

## 2018-12-12 ENCOUNTER — PATIENT MESSAGE (OUTPATIENT)
Dept: FAMILY MEDICINE | Facility: CLINIC | Age: 53
End: 2018-12-12

## 2018-12-28 ENCOUNTER — OFFICE VISIT (OUTPATIENT)
Dept: ENDOCRINOLOGY | Facility: CLINIC | Age: 53
End: 2018-12-28
Payer: COMMERCIAL

## 2018-12-28 VITALS
DIASTOLIC BLOOD PRESSURE: 90 MMHG | HEART RATE: 100 BPM | HEIGHT: 69 IN | BODY MASS INDEX: 34.2 KG/M2 | WEIGHT: 230.88 LBS | SYSTOLIC BLOOD PRESSURE: 130 MMHG

## 2018-12-28 DIAGNOSIS — R73.03 PREDIABETES: ICD-10-CM

## 2018-12-28 DIAGNOSIS — E04.2 MULTINODULAR GOITER: Primary | ICD-10-CM

## 2018-12-28 DIAGNOSIS — R63.5 WEIGHT GAIN: ICD-10-CM

## 2018-12-28 PROCEDURE — 3008F PR BODY MASS INDEX (BMI) DOCUMENTED: ICD-10-PCS | Mod: CPTII,S$GLB,, | Performed by: INTERNAL MEDICINE

## 2018-12-28 PROCEDURE — 99999 PR PBB SHADOW E&M-EST. PATIENT-LVL III: CPT | Mod: PBBFAC,,, | Performed by: INTERNAL MEDICINE

## 2018-12-28 PROCEDURE — 99999 PR PBB SHADOW E&M-EST. PATIENT-LVL III: ICD-10-PCS | Mod: PBBFAC,,, | Performed by: INTERNAL MEDICINE

## 2018-12-28 PROCEDURE — 3008F BODY MASS INDEX DOCD: CPT | Mod: CPTII,S$GLB,, | Performed by: INTERNAL MEDICINE

## 2018-12-28 PROCEDURE — 99204 PR OFFICE/OUTPT VISIT, NEW, LEVL IV, 45-59 MIN: ICD-10-PCS | Mod: S$GLB,,, | Performed by: INTERNAL MEDICINE

## 2018-12-28 PROCEDURE — 99204 OFFICE O/P NEW MOD 45 MIN: CPT | Mod: S$GLB,,, | Performed by: INTERNAL MEDICINE

## 2018-12-28 NOTE — PROGRESS NOTES
CHIEF COMPLAINT: Thyroid Nodules.   53 year old being seen as a new patient. States that she is gaining weight. States gained 30 lb in 6 months (20 lb) in Epic. States had a dex suppression. States she does try to watch the diet. She walk 30 min every other day. On zoloft for depression. Has hair loss.       PAST MEDICAL HISTORY/PAST SURGICAL HISTORY:  Reviewed in [facility]    SOCIAL HISTORY: Smoke 1/2 ppd. No alcohol.     FAMILY HISTORY:  Mother with hypothyroidism. + DM 2. + CVA.     MEDICATIONS/ALLERGIES: The patient's MedCard has been updated and reviewed.      ROS:   Constitutional: weight increased since 2017  Eyes: No recent visual changes  ENT: No dysphagia  Cardiovascular: Denies current anginal symptoms  Respiratory: Denies current respiratory difficulty  Gastrointestinal: Denies recent bowel disturbances  GenitoUrinary - No dysuria  Skin: No new skin rash  Neurologic: No focal neurologic complaints  Remainder ROS negative        PE:    GENERAL: Well developed, well nourished.  PSYCH:  appropriate mood and affect  EYES:  PERRL, EOM intact.  ENT: Nares patent, oropharynx clear, mucosa pink,   NECK: Supple, trachea midline, No palpable thyroid nodules.   CHEST: Resp even and unlabored, CTA bilateral.  CARDIAC: RRR, S1, S2 heard, no murmurs, rubs, S3, or S4  ABDOMEN: Soft, non-tender, non-distended;  No organomegaly  VASCULAR:  DP pulses +2/4 bilaterally, no edema  NEURO: Gait steady, CN II-VII grossly intact  SKIN: No areas of breakdown, no acanthosis nigracans.    LABS   Results for MITCH LONG (MRN [id number]) as of 12/28/2018 13:50   Ref. Range 11/2/2017 07:47   TSH Latest Units: mIU/L 2.46   T3, Free Latest Ref Range: 2.3 - 4.2 pg/mL 2.6   T4, Free Latest Ref Range: 0.8 - 1.8 ng/dL 0.9       Results for MITCH LONG (MRN [id number]) as of 12/28/2018 13:50   Ref. Range 11/2/2017 07:47   Thyroid Peroxidase Ab Latest Ref Range: <9 IU/mL 1   Thyroid Stimulating Immunoglob Latest Ref Range: <140 % baseline <89      Technique: Transverse and longitudinal gray scale ultrasound images of the thyroid gland with limited color Doppler analysis.    Findings:The right thyroid lobe measures approximately 3.9 x 1.3 x 1.7 cm, consistent with an estimated volume of 5 mL. It contains multiple nodules, the largest of which is a solid, hypoechoic, spongiform nodule in the upper pole of the right thyroid lobe measuring 1.1 cm. Additional smaller solid and cystic nodules are identified in the right lobe. The thyroid isthmus measures 0.3 cm in AP dimension and contains a small cystic nodule measuring 2 mm.    The left thyroid lobe measures approximately 4.5 x 1.6 x 1.5 cm, consistent with an estimated volume of 6 mL. It contains multiple subcentimeter, hypoechoic nodules, the largest of which is a hypoechoic, spongiform nodule measuring 8 mm in the lower pole. There is also an upper pole ovoid hypoechoic 8mm solid nodule with microcalcifications. Additional smaller subcentimeter solid or cystic nodules are identified.    Vascularity to each thyroid lobe is within normal limits.      Impression       1.  Normal size, multinodular thyroid gland. None of the nodules meets SRU criteria for recommended FNA.           ASSESSMENT/PLAN:  1. Multinodular Goiter- Nodules do not meet criteria for FNA. TSH WNL.     2. Weight Gain- discussed unlikely thyroid related with normal TFT. Can see what cortisol testing was done in the past. Reviewed diet as well as exercise. On SSRI which can cause weight gain    3. Prediabetes- check A1c. Discussed could look into [place] DPP program    FOLLOWUP  Records of any cortisol at [facility].   AVS  A1c today

## 2018-12-30 ENCOUNTER — PATIENT MESSAGE (OUTPATIENT)
Dept: ENDOCRINOLOGY | Facility: CLINIC | Age: 53
End: 2018-12-30

## 2018-12-31 ENCOUNTER — PATIENT MESSAGE (OUTPATIENT)
Dept: ENDOCRINOLOGY | Facility: CLINIC | Age: 53
End: 2018-12-31

## 2018-12-31 NOTE — TELEPHONE ENCOUNTER
I see her cortisol is low. Can you see if she took dexamethasone the night prior to that test. If so, then that would be normal. If not will need 8 am ACTH, Cortisol

## 2019-10-11 DIAGNOSIS — M25.522 LEFT ELBOW PAIN: Primary | ICD-10-CM

## 2020-02-26 ENCOUNTER — PATIENT OUTREACH (OUTPATIENT)
Dept: ADMINISTRATIVE | Facility: HOSPITAL | Age: 55
End: 2020-02-26

## 2020-02-26 DIAGNOSIS — Z12.31 SCREENING MAMMOGRAM, ENCOUNTER FOR: ICD-10-CM

## 2020-02-26 DIAGNOSIS — Z12.12 SCREENING FOR COLORECTAL CANCER: Primary | ICD-10-CM

## 2020-02-26 DIAGNOSIS — Z12.11 SCREENING FOR COLORECTAL CANCER: Primary | ICD-10-CM

## 2020-02-26 NOTE — PROGRESS NOTES
Chart review completed 02/26/2020.  Care Everywhere updates requested and reviewed.  Immunizations reconciled. Media reviewed.       WOG orders placed. MAMMO, FITKIT  Letter mailed.  YES (PORTAL)    PT NOT FOUND IN LINKS OR PROFILE EMPTY  NO MAMMO FOUND IN DIS    Health Maintenance Due   Topic Date Due    HIV Screening  09/30/1978    Pneumococcal Vaccine (Medium Risk) (1 of 1 - PPSV23) 09/30/1982    Mammogram  09/30/2003    Shingles Vaccine (1 of 2) 09/30/2013    Colonoscopy  09/30/2013    Influenza Vaccine (1) 09/01/2019

## 2020-03-23 ENCOUNTER — PATIENT MESSAGE (OUTPATIENT)
Dept: FAMILY MEDICINE | Facility: CLINIC | Age: 55
End: 2020-03-23

## 2020-04-26 ENCOUNTER — PATIENT MESSAGE (OUTPATIENT)
Dept: FAMILY MEDICINE | Facility: CLINIC | Age: 55
End: 2020-04-26

## 2020-04-27 ENCOUNTER — PATIENT MESSAGE (OUTPATIENT)
Dept: FAMILY MEDICINE | Facility: CLINIC | Age: 55
End: 2020-04-27

## 2020-04-30 ENCOUNTER — OFFICE VISIT (OUTPATIENT)
Dept: FAMILY MEDICINE | Facility: CLINIC | Age: 55
End: 2020-04-30
Payer: COMMERCIAL

## 2020-04-30 VITALS
OXYGEN SATURATION: 96 % | HEIGHT: 69 IN | HEART RATE: 96 BPM | DIASTOLIC BLOOD PRESSURE: 80 MMHG | TEMPERATURE: 98 F | RESPIRATION RATE: 14 BRPM | BODY MASS INDEX: 33.73 KG/M2 | WEIGHT: 227.75 LBS | SYSTOLIC BLOOD PRESSURE: 120 MMHG

## 2020-04-30 DIAGNOSIS — R73.9 HYPERGLYCEMIA: ICD-10-CM

## 2020-04-30 DIAGNOSIS — M79.671 RIGHT FOOT PAIN: ICD-10-CM

## 2020-04-30 DIAGNOSIS — E04.2 MULTIPLE THYROID NODULES: Chronic | ICD-10-CM

## 2020-04-30 DIAGNOSIS — L65.9 HAIR LOSS: ICD-10-CM

## 2020-04-30 DIAGNOSIS — E83.42 HYPOMAGNESEMIA: ICD-10-CM

## 2020-04-30 DIAGNOSIS — L20.9 ATOPIC DERMATITIS, UNSPECIFIED TYPE: ICD-10-CM

## 2020-04-30 DIAGNOSIS — R23.4 BREAST SKIN CHANGES: Primary | ICD-10-CM

## 2020-04-30 DIAGNOSIS — E78.2 MIXED HYPERLIPIDEMIA: ICD-10-CM

## 2020-04-30 PROCEDURE — 3008F PR BODY MASS INDEX (BMI) DOCUMENTED: ICD-10-PCS | Mod: CPTII,S$GLB,, | Performed by: FAMILY MEDICINE

## 2020-04-30 PROCEDURE — 99999 PR PBB SHADOW E&M-EST. PATIENT-LVL V: ICD-10-PCS | Mod: PBBFAC,,, | Performed by: FAMILY MEDICINE

## 2020-04-30 PROCEDURE — 3008F BODY MASS INDEX DOCD: CPT | Mod: CPTII,S$GLB,, | Performed by: FAMILY MEDICINE

## 2020-04-30 PROCEDURE — 99999 PR PBB SHADOW E&M-EST. PATIENT-LVL V: CPT | Mod: PBBFAC,,, | Performed by: FAMILY MEDICINE

## 2020-04-30 PROCEDURE — 99214 OFFICE O/P EST MOD 30 MIN: CPT | Mod: S$GLB,,, | Performed by: FAMILY MEDICINE

## 2020-04-30 PROCEDURE — 99214 PR OFFICE/OUTPT VISIT, EST, LEVL IV, 30-39 MIN: ICD-10-PCS | Mod: S$GLB,,, | Performed by: FAMILY MEDICINE

## 2020-04-30 RX ORDER — PROMETHAZINE HYDROCHLORIDE 12.5 MG/1
12.5 TABLET ORAL 4 TIMES DAILY
COMMUNITY

## 2020-04-30 RX ORDER — TRIAMCINOLONE ACETONIDE 1 MG/ML
LOTION TOPICAL 2 TIMES DAILY
Qty: 240 ML | Refills: 0 | Status: SHIPPED | OUTPATIENT
Start: 2020-04-30

## 2020-04-30 RX ORDER — METFORMIN HYDROCHLORIDE 500 MG/1
500 TABLET ORAL 2 TIMES DAILY WITH MEALS
COMMUNITY

## 2020-04-30 NOTE — PROGRESS NOTES
Subjective:       Patient ID: Suki Galvan is a 54 y.o. female.    Chief Complaint: Red spot on right breast    HPI  Review of Systems   Constitutional: Negative for fatigue and unexpected weight change.   Respiratory: Negative for chest tightness and shortness of breath.    Cardiovascular: Negative for chest pain, palpitations and leg swelling.   Gastrointestinal: Negative for abdominal pain.   Musculoskeletal: Positive for arthralgias and joint swelling.   Skin: Positive for rash. Negative for color change and wound.   Neurological: Negative for dizziness, syncope, light-headedness and headaches.   Psychiatric/Behavioral: The patient is nervous/anxious.        Patient Active Problem List   Diagnosis    Adjustment disorder with depressed mood    CASSIE (generalized anxiety disorder)    Moderate episode of recurrent major depressive disorder    Hyperlipidemia    Hyperglycemia    Tobacco abuse    Grief reaction    Narcolepsy-on chronic stimulants uds and contract 2/26/18    Multiple thyroid nodules    Class 1 obesity due to excess calories with serious comorbidity and body mass index (BMI) of 32.0 to 32.9 in adult    Mild intermittent asthma without complication    Seasonal allergic rhinitis due to pollen    Chronic pain of left thumb     Patient is here for a number of complaints. Has not been seen since 2018 .  Has been seeing psychiatry    C/o generalized hair loss and itchy pimple or bumpy rashes to scalp, back of neck, ant legs, between breasts , armpits and right breast.  Does not feel mass or pain in breast but skin feels different-thicker or rubbery    C/o right forefoot pain and swelling. Has broken right side of foot and big toe remotely.  No new injury.      Objective:      Physical Exam   Constitutional: She is oriented to person, place, and time. She appears well-developed and well-nourished.   Cardiovascular: Normal rate, regular rhythm and normal heart sounds.   Pulmonary/Chest: Effort  normal and breath sounds normal. Right breast exhibits skin change. Right breast exhibits no inverted nipple, no mass, no nipple discharge and no tenderness. Left breast exhibits no inverted nipple, no mass, no nipple discharge and no skin change.       Musculoskeletal: She exhibits no edema.        Right foot: There is tenderness, swelling and deformity (hammer toe 2nd toe). There is normal range of motion, no bony tenderness, normal capillary refill and no crepitus.        Feet:    Neurological: She is alert and oriented to person, place, and time.   Skin: Skin is warm and dry.   Psychiatric: She has a normal mood and affect.   Nursing note and vitals reviewed.      Assessment:       1. Breast skin changes    2. Atopic dermatitis, unspecified type    3. Right foot pain    4. Multiple thyroid nodules    5. Mixed hyperlipidemia    6. Hyperglycemia    7. Hair loss    8. Hypomagnesemia        Plan:         1. Breast skin changes  Screen and treat as indicated:    - Mammo Digital Diagnostic Bilat without C; Future  - US Breast Right Complete; Future    2. Atopic dermatitis, unspecified type  I counseled the patient on general atopic skin care such as avoidance of prolonged bathing (prefer short lukewarm showers with less frequency than daily), liberal use of moisturizers like OTC eucerin creme within 3 minutes of bathing, using moisturizing soaps otc and avoidance of skin irritants.  Avoid rubbing and scratching.  Apply prescription topical steroid creams or lotions as directed to affected areas as needed for maximum 4 weeks in specific area.      - triamcinolone acetonide 0.1% (KENALOG) 0.1 % Lotn; Apply topically 2 (two) times daily.  Dispense: 240 mL; Refill: 0    3. Right foot pain  Refer for eval and treat  - Ambulatory referral/consult to Podiatry; Future    4. Multiple thyroid nodules  Screen and treat as indicated:    - TSH; Future  - TSH    5. Mixed hyperlipidemia  Screen and treat as indicated:    -  Comprehensive metabolic panel; Future  - Lipid panel; Future  - Comprehensive metabolic panel  - Lipid panel    6. Hyperglycemia  Screen and treat as indicated:    - Hemoglobin A1c; Future  - Hemoglobin A1c    7. Hair loss  Screen and treat as indicated:    - CBC auto differential; Future  - Vitamin D; Future  - Vitamin B12; Future  - CBC auto differential  - Vitamin D  - Vitamin B12    8. Hypomagnesemia  Screen and treat as indicated:    - Magnesium; Future  - Magnesium          Time spent with patient: 20 minutes    Patient with be reevaluated in 3 months or sooner prn    Greater than 50% of this visit was spent counseling as described in above documentation:Yes

## 2020-04-30 NOTE — PATIENT INSTRUCTIONS
I counseled the patient on general atopic skin care such as avoidance of prolonged bathing (prefer short lukewarm showers with less frequency than daily), liberal use of moisturizers like OTC eucerin creme within 3 minutes of bathing, using moisturizing soaps otc and avoidance of skin irritants.  Avoid rubbing and scratching.  Apply prescription topical steroid creams or lotions as directed to affected areas as needed for maximum 4 weeks in specific area.

## 2020-05-04 ENCOUNTER — HOSPITAL ENCOUNTER (OUTPATIENT)
Dept: RADIOLOGY | Facility: HOSPITAL | Age: 55
Discharge: HOME | End: 2020-05-04
Attending: FAMILY MEDICINE
Payer: COMMERCIAL

## 2020-05-04 DIAGNOSIS — R23.4 BREAST SKIN CHANGES: ICD-10-CM

## 2020-05-04 PROCEDURE — 77066 MAMMO DIGITAL DIAGNOSTIC BILAT WITH TOMOSYNTHESIS_CAD: ICD-10-PCS | Mod: 26,,, | Performed by: RADIOLOGY

## 2020-05-04 PROCEDURE — 77066 DX MAMMO INCL CAD BI: CPT | Mod: TC

## 2020-05-04 PROCEDURE — 77062 MAMMO DIGITAL DIAGNOSTIC BILAT WITH TOMOSYNTHESIS_CAD: ICD-10-PCS | Mod: 26,,, | Performed by: RADIOLOGY

## 2020-05-04 PROCEDURE — 77062 BREAST TOMOSYNTHESIS BI: CPT | Mod: 26,,, | Performed by: RADIOLOGY

## 2020-05-04 PROCEDURE — 77066 DX MAMMO INCL CAD BI: CPT | Mod: 26,,, | Performed by: RADIOLOGY

## 2020-05-05 ENCOUNTER — PATIENT MESSAGE (OUTPATIENT)
Dept: FAMILY MEDICINE | Facility: CLINIC | Age: 55
End: 2020-05-05

## 2020-05-05 LAB
25(OH)D3 SERPL-MCNC: 36 NG/ML (ref 30–100)
ALBUMIN SERPL-MCNC: 3.9 G/DL (ref 3.6–5.1)
ALBUMIN/GLOB SERPL: 1.2 (CALC) (ref 1–2.5)
ALP SERPL-CCNC: 83 U/L (ref 37–153)
ALT SERPL-CCNC: 17 U/L (ref 6–29)
AST SERPL-CCNC: 16 U/L (ref 10–35)
BASOPHILS # BLD AUTO: 69 CELLS/UL (ref 0–200)
BASOPHILS NFR BLD AUTO: 0.9 %
BILIRUB SERPL-MCNC: 0.4 MG/DL (ref 0.2–1.2)
BUN SERPL-MCNC: 12 MG/DL (ref 7–25)
BUN/CREAT SERPL: ABNORMAL (CALC) (ref 6–22)
CALCIUM SERPL-MCNC: 9.6 MG/DL (ref 8.6–10.4)
CHLORIDE SERPL-SCNC: 104 MMOL/L (ref 98–110)
CHOLEST SERPL-MCNC: 230 MG/DL
CHOLEST/HDLC SERPL: 3.2 (CALC)
CO2 SERPL-SCNC: 30 MMOL/L (ref 20–32)
CREAT SERPL-MCNC: 0.84 MG/DL (ref 0.5–1.05)
EOSINOPHIL # BLD AUTO: 424 CELLS/UL (ref 15–500)
EOSINOPHIL NFR BLD AUTO: 5.5 %
ERYTHROCYTE [DISTWIDTH] IN BLOOD BY AUTOMATED COUNT: 13 % (ref 11–15)
GFRSERPLBLD MDRD-ARVRAT: 78 ML/MIN/1.73M2
GLOBULIN SER CALC-MCNC: 3.2 G/DL (CALC) (ref 1.9–3.7)
GLUCOSE SERPL-MCNC: 106 MG/DL (ref 65–99)
HBA1C MFR BLD: 5.7 % OF TOTAL HGB
HCT VFR BLD AUTO: 46.3 % (ref 35–45)
HDLC SERPL-MCNC: 72 MG/DL
HGB BLD-MCNC: 14.7 G/DL (ref 11.7–15.5)
LDLC SERPL CALC-MCNC: 134 MG/DL (CALC)
LYMPHOCYTES # BLD AUTO: 1579 CELLS/UL (ref 850–3900)
LYMPHOCYTES NFR BLD AUTO: 20.5 %
MAGNESIUM SERPL-MCNC: 1.8 MG/DL (ref 1.5–2.5)
MCH RBC QN AUTO: 29.1 PG (ref 27–33)
MCHC RBC AUTO-ENTMCNC: 31.7 G/DL (ref 32–36)
MCV RBC AUTO: 91.7 FL (ref 80–100)
MONOCYTES # BLD AUTO: 670 CELLS/UL (ref 200–950)
MONOCYTES NFR BLD AUTO: 8.7 %
NEUTROPHILS # BLD AUTO: 4959 CELLS/UL (ref 1500–7800)
NEUTROPHILS NFR BLD AUTO: 64.4 %
NONHDLC SERPL-MCNC: 158 MG/DL (CALC)
PLATELET # BLD AUTO: 376 THOUSAND/UL (ref 140–400)
PMV BLD REES-ECKER: 10.4 FL (ref 7.5–12.5)
POTASSIUM SERPL-SCNC: 4.6 MMOL/L (ref 3.5–5.3)
PROT SERPL-MCNC: 7.1 G/DL (ref 6.1–8.1)
RBC # BLD AUTO: 5.05 MILLION/UL (ref 3.8–5.1)
SODIUM SERPL-SCNC: 141 MMOL/L (ref 135–146)
TRIGL SERPL-MCNC: 129 MG/DL
TSH SERPL-ACNC: 2.68 MIU/L (ref 0.4–4.5)
WBC # BLD AUTO: 7.7 THOUSAND/UL (ref 3.8–10.8)

## 2020-05-06 LAB — VIT B12 SERPL-MCNC: 606 PG/ML (ref 200–1100)

## 2020-07-06 ENCOUNTER — OFFICE VISIT (OUTPATIENT)
Dept: DERMATOLOGY | Facility: CLINIC | Age: 55
End: 2020-07-06
Payer: COMMERCIAL

## 2020-07-06 DIAGNOSIS — L65.9 ALOPECIA: ICD-10-CM

## 2020-07-06 DIAGNOSIS — R79.89 LOW VITAMIN D LEVEL: ICD-10-CM

## 2020-07-06 DIAGNOSIS — L21.9 SEBORRHEA: Primary | ICD-10-CM

## 2020-07-06 PROCEDURE — 99201 PR OFFICE/OUTPT VISIT,NEW,LEVL I: CPT | Mod: 95,,, | Performed by: DERMATOLOGY

## 2020-07-06 PROCEDURE — 99201 PR OFFICE/OUTPT VISIT,NEW,LEVL I: ICD-10-PCS | Mod: 95,,, | Performed by: DERMATOLOGY

## 2020-07-06 NOTE — PROGRESS NOTES
The patient location is: home  The chief complaint leading to consultation is: hairloss    Visit type: audiovisual    Face to Face time with patient: 10 m  15 minutes of total time spent on the encounter, which includes face to face time and non-face to face time preparing to see the patient (eg, review of tests), Obtaining and/or reviewing separately obtained history, Documenting clinical information in the electronic or other health record, Independently interpreting results (not separately reported) and communicating results to the patient/family/caregiver, or Care coordination (not separately reported).         Each patient to whom he or she provides medical services by telemedicine is:  (1) informed of the relationship between the physician and patient and the respective role of any other health care provider with respect to management of the patient; and (2) notified that he or she may decline to receive medical services by telemedicine and may withdraw from such care at any time.    Notes:     Subjective:       Patient ID:  Suki Galvan is a 54 y.o. female who presents for No chief complaint on file.    Pt shedding hair x 1.5 years.  Then gets some small regrowth.  Repeats over and over.  Tried different shampoos to no avail.  No official therapy otherwise.      Review of Systems   Constitutional: Negative for fever.   HENT: Negative for sore throat.    Respiratory: Negative for cough.    Musculoskeletal: Negative for myalgias and arthralgias.   Skin: Positive for itching.        Objective:    Physical Exam   Constitutional: She appears well-developed and well-nourished.   Eyes: No conjunctival no injection.   Neurological: She is alert and oriented to person, place, and time.   Psychiatric: She has a normal mood and affect.   Skin:   Areas Examined (abnormalities noted in diagram):   Scalp / Hair Palpated and Inspected  Head / Face Inspection Performed              Diagram Legend     Erythematous  scaling macule/papule c/w actinic keratosis       Vascular papule c/w angioma      Pigmented verrucoid papule/plaque c/w seborrheic keratosis      Yellow umbilicated papule c/w sebaceous hyperplasia      Irregularly shaped tan macule c/w lentigo     1-2 mm smooth white papules consistent with Milia      Movable subcutaneous cyst with punctum c/w epidermal inclusion cyst      Subcutaneous movable cyst c/w pilar cyst      Firm pink to brown papule c/w dermatofibroma      Pedunculated fleshy papule(s) c/w skin tag(s)      Evenly pigmented macule c/w junctional nevus     Mildly variegated pigmented, slightly irregular-bordered macule c/w mildly atypical nevus      Flesh colored to evenly pigmented papule c/w intradermal nevus       Pink pearly papule/plaque c/w basal cell carcinoma      Erythematous hyperkeratotic cursted plaque c/w SCC      Surgical scar with no sign of skin cancer recurrence      Open and closed comedones      Inflammatory papules and pustules      Verrucoid papule consistent consistent with wart     Erythematous eczematous patches and plaques     Dystrophic onycholytic nail with subungual debris c/w onychomycosis     Umbilicated papule    Erythematous-base heme-crusted tan verrucoid plaque consistent with inflamed seborrheic keratosis     Erythematous Silvery Scaling Plaque c/w Psoriasis     See annotation      Assessment / Plan:        Seborrhea  Discussed with patient the etiology and pathogenesis of the disease or skin lesion(s) and possible treatments and aggravators.    Reviewed with patient different treatment options and associated risks.  Instructed patient to use plain Head and Shoulders shampoo regularly for soaks lasting at least 3 minutes or more to the scalp.  Regular shampoo and conditioner afterward is fine.  For suspected allergy cases, rinse away from the face and body discussed.    Alopecia  Previous [facility] labs and or records and notes reviewed and considered for their impact on  our clinical decision making today.  Reviewed with patient to eat protein daily, get labs done, wash with recommended shampoo or soap for the scalp, avoid hair coloring, and potentially consider topical 5% minoxidil.  Reviewed with patient different treatment options and associated risks.  Discussed with patient the etiology and pathogenesis of the disease or skin lesion(s) and possible treatments and aggravators.      Low vit d  Needs to bump up by 3000 iu bid.  Discussed with patient the etiology and pathogenesis of the disease or skin lesion(s) and possible treatments and aggravators.               Follow up in about 4 weeks (around 8/3/2020).

## 2020-08-10 ENCOUNTER — TELEPHONE (OUTPATIENT)
Dept: DERMATOLOGY | Facility: CLINIC | Age: 55
End: 2020-08-10

## 2020-08-10 ENCOUNTER — OFFICE VISIT (OUTPATIENT)
Dept: DERMATOLOGY | Facility: CLINIC | Age: 55
End: 2020-08-10
Payer: COMMERCIAL

## 2020-08-10 ENCOUNTER — LAB VISIT (OUTPATIENT)
Dept: LAB | Facility: HOSPITAL | Age: 55
End: 2020-08-10
Attending: DERMATOLOGY
Payer: COMMERCIAL

## 2020-08-10 VITALS — HEIGHT: 69 IN | BODY MASS INDEX: 33.62 KG/M2 | WEIGHT: 227 LBS

## 2020-08-10 DIAGNOSIS — L29.9 SCALP ITCH: ICD-10-CM

## 2020-08-10 DIAGNOSIS — L21.9 SEBORRHEA: ICD-10-CM

## 2020-08-10 DIAGNOSIS — L65.9 ALOPECIA: Primary | ICD-10-CM

## 2020-08-10 DIAGNOSIS — L65.9 ALOPECIA: ICD-10-CM

## 2020-08-10 LAB
FERRITIN SERPL-MCNC: 147 NG/ML (ref 20–300)
TSH SERPL DL<=0.005 MIU/L-ACNC: 2.54 UIU/ML (ref 0.4–4)
VIT B12 SERPL-MCNC: 361 PG/ML (ref 210–950)

## 2020-08-10 PROCEDURE — 99213 OFFICE O/P EST LOW 20 MIN: CPT | Mod: S$GLB,,, | Performed by: DERMATOLOGY

## 2020-08-10 PROCEDURE — 99999 PR PBB SHADOW E&M-EST. PATIENT-LVL III: CPT | Mod: PBBFAC,,, | Performed by: DERMATOLOGY

## 2020-08-10 PROCEDURE — 83540 ASSAY OF IRON: CPT

## 2020-08-10 PROCEDURE — 82728 ASSAY OF FERRITIN: CPT

## 2020-08-10 PROCEDURE — 36415 COLL VENOUS BLD VENIPUNCTURE: CPT

## 2020-08-10 PROCEDURE — 82652 VIT D 1 25-DIHYDROXY: CPT

## 2020-08-10 PROCEDURE — 3008F BODY MASS INDEX DOCD: CPT | Mod: CPTII,S$GLB,, | Performed by: DERMATOLOGY

## 2020-08-10 PROCEDURE — 82607 VITAMIN B-12: CPT

## 2020-08-10 PROCEDURE — 99999 PR PBB SHADOW E&M-EST. PATIENT-LVL III: ICD-10-PCS | Mod: PBBFAC,,, | Performed by: DERMATOLOGY

## 2020-08-10 PROCEDURE — 84630 ASSAY OF ZINC: CPT

## 2020-08-10 PROCEDURE — 3008F PR BODY MASS INDEX (BMI) DOCUMENTED: ICD-10-PCS | Mod: CPTII,S$GLB,, | Performed by: DERMATOLOGY

## 2020-08-10 PROCEDURE — 99213 PR OFFICE/OUTPT VISIT, EST, LEVL III, 20-29 MIN: ICD-10-PCS | Mod: S$GLB,,, | Performed by: DERMATOLOGY

## 2020-08-10 PROCEDURE — 84443 ASSAY THYROID STIM HORMONE: CPT

## 2020-08-10 RX ORDER — BUPROPION HYDROCHLORIDE 100 MG/1
TABLET, EXTENDED RELEASE ORAL
COMMUNITY
Start: 2020-07-03

## 2020-08-10 RX ORDER — BUPROPION HYDROCHLORIDE 150 MG/1
TABLET ORAL
COMMUNITY
Start: 2020-07-16

## 2020-08-10 RX ORDER — DEXTROAMPHETAMINE SULFATE 10 MG/1
TABLET ORAL
COMMUNITY
Start: 2020-07-05

## 2020-08-10 RX ORDER — FLUOCINONIDE TOPICAL SOLUTION USP, 0.05% 0.5 MG/ML
SOLUTION TOPICAL 2 TIMES DAILY
Qty: 60 ML | Refills: 0 | Status: SHIPPED | OUTPATIENT
Start: 2020-08-10

## 2020-08-10 RX ORDER — CLONAZEPAM 1 MG/1
TABLET ORAL
COMMUNITY
Start: 2020-07-16

## 2020-08-10 NOTE — TELEPHONE ENCOUNTER
----- Message from Nat Hyman sent at 8/10/2020  3:45 PM CDT -----  Regarding: advice  Contact: CATALINO LONG [[id number]]  Patient is requesting a call back from the nurse want to know which shampoo to get, which brand?    Please call the patient upon request at phone number [phone].

## 2020-08-10 NOTE — PROGRESS NOTES
Subjective:       Patient ID:  Suki Gavlan is a 54 y.o. female who presents for   Chief Complaint   Patient presents with    Hair/Scalp Problem     Ms Galvan Present today for a follow up on scalp problem.    Hair keeps shedding.    Still gets hot spots with itch and dryness of scalp.  Had tac lotion in the past.      Review of Systems   Constitutional: Negative for fever and chills.   Respiratory: Negative for cough and shortness of breath.    Musculoskeletal: Negative for joint swelling.   Skin: Positive for itching, rash and dry skin.        Objective:    Physical Exam   Constitutional: She appears well-developed and well-nourished.   Eyes: No conjunctival no injection.   Neurological: She is alert and oriented to person, place, and time.   Psychiatric: She has a normal mood and affect.   Skin:   Areas Examined (abnormalities noted in diagram):   Scalp / Hair Palpated and Inspected  Head / Face Inspection Performed  RLE Inspected  LLE Inspection Performed                   Diagram Legend     Erythematous scaling macule/papule c/w actinic keratosis       Vascular papule c/w angioma      Pigmented verrucoid papule/plaque c/w seborrheic keratosis      Yellow umbilicated papule c/w sebaceous hyperplasia      Irregularly shaped tan macule c/w lentigo     1-2 mm smooth white papules consistent with Milia      Movable subcutaneous cyst with punctum c/w epidermal inclusion cyst      Subcutaneous movable cyst c/w pilar cyst      Firm pink to brown papule c/w dermatofibroma      Pedunculated fleshy papule(s) c/w skin tag(s)      Evenly pigmented macule c/w junctional nevus     Mildly variegated pigmented, slightly irregular-bordered macule c/w mildly atypical nevus      Flesh colored to evenly pigmented papule c/w intradermal nevus       Pink pearly papule/plaque c/w basal cell carcinoma      Erythematous hyperkeratotic cursted plaque c/w SCC      Surgical scar with no sign of skin cancer recurrence      Open  and closed comedones      Inflammatory papules and pustules      Verrucoid papule consistent consistent with wart     Erythematous eczematous patches and plaques     Dystrophic onycholytic nail with subungual debris c/w onychomycosis     Umbilicated papule    Erythematous-base heme-crusted tan verrucoid plaque consistent with inflamed seborrheic keratosis     Erythematous Silvery Scaling Plaque c/w Psoriasis     See annotation      Assessment / Plan:        Alopecia  -     Zinc; Future; Expected date: 08/10/2020  -     Calcitriol; Future; Expected date: 08/10/2020  -     Vitamin B12; Future; Expected date: 08/10/2020  -     Ferritin; Future; Expected date: 08/10/2020  -     Iron and TIBC; Future; Expected date: 08/10/2020  -     TSH; Future; Expected date: 08/10/2020  Discussed with patient the need for laboratory work up for further evaluation.  Discussed that such investigation may not be helpful.  Reviewed with patient to eat protein daily, get labs done, wash with recommended shampoo or soap for the scalp, avoid hair coloring, and potentially consider topical 5% minoxidil.  Brochure given for patient education.    Seborrhea  -     fluocinonide (LIDEX) 0.05 % external solution; Apply topically 2 (two) times daily. Prn rash scalp.  Dispense: 60 mL; Refill: 0  Reviewed with patient different treatment options and associated risks.  Discussed with patient the etiology and pathogenesis of the disease or skin lesion(s) and possible treatments and aggravators.    Patient to start 5% crude tar shampoo to be used as scalp soaks for at least 3 minutes, longer if possible, per their regular shampooing schedule.  Stop h and s for now.  Proper application of medications and or care for affected area(s) and condition(s) reviewed.    Scalp itch  -     fluocinonide (LIDEX) 0.05 % external solution; Apply topically 2 (two) times daily. Prn rash scalp.  Dispense: 60 mL; Refill: 0  Reviewed with patient different treatment options  and associated risks.  Proper application of medications and or care for affected area(s) and condition(s) reviewed.        Lentigoes  Discussed with patient the benign nature of these lesions and that no treatment is indicated.               Follow up if symptoms worsen or fail to improve, for Post labs.

## 2020-08-10 NOTE — TELEPHONE ENCOUNTER
Spoke with patient, states she can only find [place] Tar shampoo on line.  Is that the same thing as the crude tar shampoo?

## 2020-08-10 NOTE — TELEPHONE ENCOUNTER
Returned pt call regarding shampoo recommendation, no answer, left message on vm that she will have to go online to get that information.    ----- Message from Deborah Hernandez sent at 8/10/2020 11:08 AM CDT -----  Contact: pt, [phone] (M)  Pt states she was just seen and has a question about shampoo recommendation. Please call.

## 2020-08-11 LAB
IRON SERPL-MCNC: 60 UG/DL (ref 30–160)
SATURATED IRON: 16 % (ref 20–50)
TOTAL IRON BINDING CAPACITY: 374 UG/DL (ref 250–450)
TRANSFERRIN SERPL-MCNC: 253 MG/DL (ref 200–375)

## 2020-08-12 ENCOUNTER — PATIENT MESSAGE (OUTPATIENT)
Dept: DERMATOLOGY | Facility: CLINIC | Age: 55
End: 2020-08-12

## 2020-08-13 LAB
1,25(OH)2D3 SERPL-MCNC: 18 PG/ML (ref 20–79)
ZINC SERPL-MCNC: 68 UG/DL (ref 60–130)

## 2020-08-15 ENCOUNTER — PATIENT MESSAGE (OUTPATIENT)
Dept: DERMATOLOGY | Facility: CLINIC | Age: 55
End: 2020-08-15

## 2020-09-22 ENCOUNTER — PATIENT MESSAGE (OUTPATIENT)
Dept: DERMATOLOGY | Facility: CLINIC | Age: 55
End: 2020-09-22

## 2020-09-22 ENCOUNTER — TELEPHONE (OUTPATIENT)
Dept: DERMATOLOGY | Facility: CLINIC | Age: 55
End: 2020-09-22

## 2020-09-22 NOTE — TELEPHONE ENCOUNTER
Spoke with the pt. She rather have an appointment after having labs rechecked. Can you put the orders in ?        TB  ----- Message from Lazaro Bruno sent at 9/22/2020 12:44 PM CDT -----  Regarding: pt  Type: Needs Medical Advice    Who Called:  pt    Best Call Back Number: [phone]   Additional Information: pt has 8am VV 9/23 to check blood work. However, pt has not done blood work b/c no orders were put in. Please have orders put in and call pt to schedule. Also pt will need to reschedule tomorrows appt for follow up with dr stafford, please assist with reschedule after schedule labs. Thanks.

## 2020-09-23 DIAGNOSIS — L65.9 ALOPECIA: Primary | ICD-10-CM

## 2020-09-28 ENCOUNTER — PATIENT MESSAGE (OUTPATIENT)
Dept: DERMATOLOGY | Facility: CLINIC | Age: 55
End: 2020-09-28

## 2020-10-05 ENCOUNTER — PATIENT MESSAGE (OUTPATIENT)
Dept: ADMINISTRATIVE | Facility: HOSPITAL | Age: 55
End: 2020-10-05

## 2020-11-03 ENCOUNTER — PATIENT MESSAGE (OUTPATIENT)
Dept: FAMILY MEDICINE | Facility: CLINIC | Age: 55
End: 2020-11-03

## 2020-11-04 DIAGNOSIS — J45.20 MILD INTERMITTENT ASTHMA WITHOUT COMPLICATION: ICD-10-CM

## 2020-11-04 RX ORDER — ALBUTEROL SULFATE 90 UG/1
2 AEROSOL, METERED RESPIRATORY (INHALATION) EVERY 6 HOURS PRN
Qty: 18 G | Status: CANCELLED | OUTPATIENT
Start: 2020-11-04 | End: 2021-11-04

## 2020-11-05 ENCOUNTER — TELEPHONE (OUTPATIENT)
Dept: FAMILY MEDICINE | Facility: CLINIC | Age: 55
End: 2020-11-05

## 2020-11-05 DIAGNOSIS — J45.20 MILD INTERMITTENT ASTHMA WITHOUT COMPLICATION: ICD-10-CM

## 2020-11-07 RX ORDER — ALBUTEROL SULFATE 90 UG/1
2 AEROSOL, METERED RESPIRATORY (INHALATION) EVERY 6 HOURS PRN
Qty: 18 G | Status: SHIPPED | OUTPATIENT
Start: 2020-11-07 | End: 2021-11-07

## 2020-11-18 ENCOUNTER — PATIENT MESSAGE (OUTPATIENT)
Dept: FAMILY MEDICINE | Facility: CLINIC | Age: 55
End: 2020-11-18

## 2020-11-20 ENCOUNTER — OFFICE VISIT (OUTPATIENT)
Dept: URGENT CARE | Facility: CLINIC | Age: 55
End: 2020-11-20
Payer: COMMERCIAL

## 2020-11-20 VITALS
SYSTOLIC BLOOD PRESSURE: 130 MMHG | RESPIRATION RATE: 18 BRPM | HEART RATE: 81 BPM | OXYGEN SATURATION: 96 % | TEMPERATURE: 98 F | DIASTOLIC BLOOD PRESSURE: 84 MMHG

## 2020-11-20 DIAGNOSIS — Z20.822 ENCOUNTER FOR LABORATORY TESTING FOR COVID-19 VIRUS: ICD-10-CM

## 2020-11-20 DIAGNOSIS — R05.9 COUGH: ICD-10-CM

## 2020-11-20 DIAGNOSIS — J18.9 ATYPICAL PNEUMONIA: Primary | ICD-10-CM

## 2020-11-20 LAB
CTP QC/QA: YES
CTP QC/QA: YES
FLUAV AG NPH QL: NEGATIVE
FLUBV AG NPH QL: NEGATIVE
SARS-COV-2 RDRP RESP QL NAA+PROBE: NEGATIVE

## 2020-11-20 PROCEDURE — 99204 OFFICE O/P NEW MOD 45 MIN: CPT | Mod: 25,S$GLB,, | Performed by: NURSE PRACTITIONER

## 2020-11-20 PROCEDURE — U0002 COVID-19 LAB TEST NON-CDC: HCPCS | Mod: QW,S$GLB,, | Performed by: NURSE PRACTITIONER

## 2020-11-20 PROCEDURE — 99204 PR OFFICE/OUTPT VISIT, NEW, LEVL IV, 45-59 MIN: ICD-10-PCS | Mod: 25,S$GLB,, | Performed by: NURSE PRACTITIONER

## 2020-11-20 PROCEDURE — U0002: ICD-10-PCS | Mod: QW,S$GLB,, | Performed by: NURSE PRACTITIONER

## 2020-11-20 PROCEDURE — 71046 X-RAY EXAM CHEST 2 VIEWS: CPT | Mod: S$GLB,,, | Performed by: NURSE PRACTITIONER

## 2020-11-20 PROCEDURE — 87804 POCT INFLUENZA A/B: ICD-10-PCS | Mod: 59,QW,, | Performed by: NURSE PRACTITIONER

## 2020-11-20 PROCEDURE — 87804 INFLUENZA ASSAY W/OPTIC: CPT | Mod: QW,,, | Performed by: NURSE PRACTITIONER

## 2020-11-20 PROCEDURE — 71046 PR XRAY, CHEST, 2 VIEWS: ICD-10-PCS | Mod: S$GLB,,, | Performed by: NURSE PRACTITIONER

## 2020-11-20 RX ORDER — AZITHROMYCIN 250 MG/1
TABLET, FILM COATED ORAL
Qty: 6 TABLET | Refills: 0 | Status: SHIPPED | OUTPATIENT
Start: 2020-11-20

## 2020-11-20 NOTE — PATIENT INSTRUCTIONS
Pneumonia (Adult)  Pneumonia is an infection deep within the lungs. It is in the small air sacs (alveoli). Pneumonia may be caused by a virus or bacteria. Pneumonia caused by bacteria is usually treated with an antibiotic. Severe cases may need to be treated in the hospital. Milder cases can be treated at home. Symptoms usually start to get better during the first 2 days of treatment.    Home care  Follow these guidelines when caring for yourself at home:  · Rest at home for the first 2 to 3 days, or until you feel stronger. Dont let yourself get overly tired when you go back to your activities.  · Stay away from cigarette smoke - yours or other peoples.  · You may use acetaminophen or ibuprofen to control fever or pain, unless another medicine was prescribed. If you have chronic liver or kidney disease, talk with your healthcare provider before using these medicines. Also talk with your provider if youve had a stomach ulcer or gastrointestinal bleeding. Dont give aspirin to anyone younger than 18 years of age who is ill with a fever. It may cause severe liver damage.  · Your appetite may be poor, so a light diet is fine.  · Drink 6 to 8 glasses of fluids every day to make sure you are getting enough fluids. Beverages can include water, sport drinks, sodas without caffeine, juices, tea, or soup. Fluids will help loosen secretions in the lung. This will make it easier for you to cough up the phlegm (sputum). If you also have heart or kidney disease, check with your healthcare provider before you drink extra fluids.  · Take antibiotic medicine prescribed until it is all gone, even if you are feeling better after a few days.  Follow-up care  Follow up with your healthcare provider in the next 2 to 3 days, or as advised. This is to be sure the medicine is helping you get better.  If you are 65 or older, you should get a pneumococcal vaccine and a yearly flu (influenza) shot. You should also get these vaccines if  you have chronic lung disease like asthma, emphysema, or COPD. Recently, a second type of pneumonia vaccine has become available for everyone over 65 years old. This is in addition to the previous vaccine. Ask your provider about this.  When to seek medical advice  Call your healthcare provider right away if any of these occur:  · You dont get better within the first 48 hours of treatment  · Shortness of breath gets worse  · Rapid breathing (more than 25 breaths per minute)  · Coughing up blood  · Chest pain gets worse with breathing  · Fever of 100.4°F (38°C) or higher that doesnt get better with fever medicine  · Weakness, dizziness, or fainting that gets worse  · Thirst or dry mouth that gets worse  · Sinus pain, headache, or a stiff neck  · Chest pain not caused by coughing  Date Last Reviewed: 1/1/2017  © 3724-8574 [employer]. [address] [place] [address]. All rights reserved. This information is not intended as a substitute for professional medical care. Always follow your healthcare professional's instructions.

## 2020-11-20 NOTE — PROGRESS NOTES
Patient presented to [facility] for screening and testing. Two patient identifiers verified prior to specimen collection. Provider aware of pt vital signs. Questions answered and education on testing and receiving test results given. Pt expressed understanding.

## 2020-11-20 NOTE — PROGRESS NOTES
Subjective:       Patient ID: Suki Galvan is a 55 y.o. female.    Vitals:  temperature is 97.9 °F (36.6 °C). Her blood pressure is 130/84 and her pulse is 81. Her respiration is 18 and oxygen saturation is 96%.     Chief Complaint: No chief complaint on file.    Presents with cough, SOB, congestion x 2 weeks, worsening since then  Concerned she has flu or possibly covid  Feels wheezing to upper airway      Constitution: Positive for fatigue. Negative for chills and fever.   HENT: Positive for congestion, postnasal drip, sinus pain, sinus pressure and sore throat.    Neck: Negative for painful lymph nodes.   Cardiovascular: Negative for chest pain and leg swelling.   Eyes: Negative for double vision and blurred vision.   Respiratory: Positive for cough, shortness of breath and wheezing.    Gastrointestinal: Negative for nausea, vomiting and diarrhea.   Genitourinary: Negative for dysuria, frequency, urgency and history of kidney stones.   Musculoskeletal: Negative for joint pain, joint swelling, muscle cramps and muscle ache.   Skin: Negative for color change, pale, rash and bruising.   Allergic/Immunologic: Negative for seasonal allergies.   Neurological: Negative for dizziness, history of vertigo, light-headedness, passing out and headaches.   Hematologic/Lymphatic: Negative for swollen lymph nodes.   Psychiatric/Behavioral: Negative for nervous/anxious, sleep disturbance and depression. The patient is not nervous/anxious.        Objective:      Physical Exam   Constitutional: She is oriented to person, place, and time. She appears well-developed. She is cooperative.  Non-toxic appearance. She does not appear ill. No distress.   HENT:   Head: Normocephalic and atraumatic.   Ears:   Right Ear: Hearing, external ear and ear canal normal. A middle ear effusion is present.   Left Ear: Hearing, external ear and ear canal normal. A middle ear effusion is present.   Nose: Rhinorrhea present. No mucosal edema or  nasal deformity. No epistaxis. Right sinus exhibits maxillary sinus tenderness. Right sinus exhibits no frontal sinus tenderness. Left sinus exhibits maxillary sinus tenderness. Left sinus exhibits no frontal sinus tenderness.   Mouth/Throat: Uvula is midline and mucous membranes are normal. No trismus in the jaw. Normal dentition. No uvula swelling. Posterior oropharyngeal erythema and cobblestoning present.   Eyes: Conjunctivae and lids are normal. Right eye exhibits no discharge. Left eye exhibits no discharge. No scleral icterus.   Neck: Trachea normal, normal range of motion, full passive range of motion without pain and phonation normal. Neck supple.   Cardiovascular: Normal rate, regular rhythm, normal heart sounds and normal pulses.   Pulmonary/Chest: Effort normal. No respiratory distress. She has rhonchi in the right lower field and the left lower field.   Abdominal: Soft. Normal appearance and bowel sounds are normal. She exhibits no distension, no pulsatile midline mass and no mass. There is no abdominal tenderness.   Musculoskeletal: Normal range of motion.         General: No deformity.   Neurological: She is alert and oriented to person, place, and time. She exhibits normal muscle tone. Coordination normal.   Skin: Skin is warm, dry, intact, not diaphoretic and not pale. Psychiatric: Her speech is normal and behavior is normal. Judgment and thought content normal.   Nursing note and vitals reviewed.        Assessment:       1. Atypical pneumonia    2. Encounter for laboratory testing for COVID-19 virus    3. Cough        Plan:       Negative covid and flu  Xray reviewed by me, no obvious acute process  Due to complaint, symptoms and assessment, will treat as atypical pneumonia  Follow up with PCP  Atypical pneumonia    Encounter for laboratory testing for COVID-19 virus  -     POCT COVID-19 Rapid Screening    Cough  -     POCT Influenza A/B  -     X-Ray Chest PA And Lateral; Future; Expected date:  11/20/2020    Other orders  -     azithromycin (Z-MARYCRUZ) 250 MG tablet; Take two tablets on day one by mouth, then one tablet by mouth on days 2-5  Dispense: 6 tablet; Refill: 0  -     dexchlorphen-phenylephrine-DM 1-5-10 mg/5 mL Syrp; Take 5 mLs by mouth every 4 (four) hours as needed.  Dispense: 240 mL; Refill: 0

## 2020-11-23 ENCOUNTER — PATIENT MESSAGE (OUTPATIENT)
Dept: FAMILY MEDICINE | Facility: CLINIC | Age: 55
End: 2020-11-23

## 2020-11-25 NOTE — TELEPHONE ENCOUNTER
Offer OV with me or VALERIE  
Pt seen  11/2020. Please review and advise. Pt given Z-alissa . Chest XRAY in system.   
0.04

## 2020-12-14 ENCOUNTER — PATIENT MESSAGE (OUTPATIENT)
Dept: FAMILY MEDICINE | Facility: CLINIC | Age: 55
End: 2020-12-14

## 2020-12-14 ENCOUNTER — HOSPITAL ENCOUNTER (OUTPATIENT)
Dept: RADIOLOGY | Facility: CLINIC | Age: 55
Discharge: HOME | End: 2020-12-14
Attending: NURSE PRACTITIONER
Payer: COMMERCIAL

## 2020-12-14 DIAGNOSIS — R05.9 COUGH: ICD-10-CM

## 2020-12-14 PROCEDURE — 71046 XR CHEST PA AND LATERAL: ICD-10-PCS | Mod: 26,,, | Performed by: RADIOLOGY

## 2020-12-14 PROCEDURE — 71046 X-RAY EXAM CHEST 2 VIEWS: CPT | Mod: TC,FY,PO

## 2020-12-14 PROCEDURE — 71046 X-RAY EXAM CHEST 2 VIEWS: CPT | Mod: 26,,, | Performed by: RADIOLOGY

## 2020-12-15 ENCOUNTER — PATIENT MESSAGE (OUTPATIENT)
Dept: FAMILY MEDICINE | Facility: CLINIC | Age: 55
End: 2020-12-15

## 2021-01-04 ENCOUNTER — PATIENT MESSAGE (OUTPATIENT)
Dept: ADMINISTRATIVE | Facility: HOSPITAL | Age: 56
End: 2021-01-04

## 2021-03-10 DIAGNOSIS — Z12.11 COLON CANCER SCREENING: ICD-10-CM

## 2021-04-05 ENCOUNTER — PATIENT MESSAGE (OUTPATIENT)
Dept: ADMINISTRATIVE | Facility: HOSPITAL | Age: 56
End: 2021-04-05

## 2021-05-12 ENCOUNTER — PATIENT MESSAGE (OUTPATIENT)
Dept: RESEARCH | Facility: HOSPITAL | Age: 56
End: 2021-05-12

## 2021-07-06 ENCOUNTER — PATIENT MESSAGE (OUTPATIENT)
Dept: ADMINISTRATIVE | Facility: HOSPITAL | Age: 56
End: 2021-07-06

## 2021-07-07 ENCOUNTER — PATIENT MESSAGE (OUTPATIENT)
Dept: ADMINISTRATIVE | Facility: HOSPITAL | Age: 56
End: 2021-07-07

## 2021-09-16 ENCOUNTER — OFFICE VISIT (OUTPATIENT)
Dept: RHEUMATOLOGY | Facility: CLINIC | Age: 56
End: 2021-09-16
Payer: COMMERCIAL

## 2021-09-16 ENCOUNTER — TELEPHONE (OUTPATIENT)
Dept: RHEUMATOLOGY | Facility: CLINIC | Age: 56
End: 2021-09-16

## 2021-09-16 ENCOUNTER — PATIENT MESSAGE (OUTPATIENT)
Dept: RHEUMATOLOGY | Facility: CLINIC | Age: 56
End: 2021-09-16

## 2021-09-16 VITALS
BODY MASS INDEX: 34.17 KG/M2 | DIASTOLIC BLOOD PRESSURE: 82 MMHG | SYSTOLIC BLOOD PRESSURE: 128 MMHG | WEIGHT: 225.5 LBS | HEIGHT: 68 IN | HEART RATE: 80 BPM

## 2021-09-16 DIAGNOSIS — M25.50 POLYARTHRALGIA: Primary | ICD-10-CM

## 2021-09-16 PROCEDURE — 3074F SYST BP LT 130 MM HG: CPT | Mod: CPTII,S$GLB,, | Performed by: INTERNAL MEDICINE

## 2021-09-16 PROCEDURE — 99205 PR OFFICE/OUTPT VISIT, NEW, LEVL V, 60-74 MIN: ICD-10-PCS | Mod: S$GLB,,, | Performed by: INTERNAL MEDICINE

## 2021-09-16 PROCEDURE — 3079F PR MOST RECENT DIASTOLIC BLOOD PRESSURE 80-89 MM HG: ICD-10-PCS | Mod: CPTII,S$GLB,, | Performed by: INTERNAL MEDICINE

## 2021-09-16 PROCEDURE — 3008F PR BODY MASS INDEX (BMI) DOCUMENTED: ICD-10-PCS | Mod: CPTII,S$GLB,, | Performed by: INTERNAL MEDICINE

## 2021-09-16 PROCEDURE — 3074F PR MOST RECENT SYSTOLIC BLOOD PRESSURE < 130 MM HG: ICD-10-PCS | Mod: CPTII,S$GLB,, | Performed by: INTERNAL MEDICINE

## 2021-09-16 PROCEDURE — 1159F MED LIST DOCD IN RCRD: CPT | Mod: CPTII,S$GLB,, | Performed by: INTERNAL MEDICINE

## 2021-09-16 PROCEDURE — 99999 PR PBB SHADOW E&M-EST. PATIENT-LVL IV: CPT | Mod: PBBFAC,,, | Performed by: INTERNAL MEDICINE

## 2021-09-16 PROCEDURE — 99205 OFFICE O/P NEW HI 60 MIN: CPT | Mod: S$GLB,,, | Performed by: INTERNAL MEDICINE

## 2021-09-16 PROCEDURE — 1159F PR MEDICATION LIST DOCUMENTED IN MEDICAL RECORD: ICD-10-PCS | Mod: CPTII,S$GLB,, | Performed by: INTERNAL MEDICINE

## 2021-09-16 PROCEDURE — 99999 PR PBB SHADOW E&M-EST. PATIENT-LVL IV: ICD-10-PCS | Mod: PBBFAC,,, | Performed by: INTERNAL MEDICINE

## 2021-09-16 PROCEDURE — 3008F BODY MASS INDEX DOCD: CPT | Mod: CPTII,S$GLB,, | Performed by: INTERNAL MEDICINE

## 2021-09-16 PROCEDURE — 3079F DIAST BP 80-89 MM HG: CPT | Mod: CPTII,S$GLB,, | Performed by: INTERNAL MEDICINE

## 2021-09-16 RX ORDER — LIOTHYRONINE SODIUM 5 UG/1
10 TABLET ORAL DAILY
COMMUNITY
Start: 2021-08-26

## 2021-09-22 LAB
APPEARANCE UR: CLEAR
BILIRUB UR QL STRIP: NEGATIVE
C3 SERPL-MCNC: 157 MG/DL (ref 83–193)
C4 SERPL-MCNC: 30 MG/DL (ref 15–57)
COLOR UR: YELLOW
CREAT UR-MCNC: 134 MG/DL (ref 20–275)
GLUCOSE UR QL STRIP: NEGATIVE
HGB UR QL STRIP: NEGATIVE
KETONES UR QL STRIP: NEGATIVE
LEUKOCYTE ESTERASE UR QL STRIP: ABNORMAL
NITRITE UR QL STRIP: NEGATIVE
PH UR STRIP: 6.5 [PH] (ref 5–8)
PROT UR QL STRIP: NEGATIVE
PROT UR-MCNC: 8 MG/DL (ref 5–24)
PROT/CREAT UR: 0.06 MG/MG CREAT (ref 0.02–0.16)
PROT/CREAT UR: 60 MG/G CREAT (ref 21–161)
SP GR UR STRIP: 1.02 (ref 1–1.03)

## 2021-09-23 ENCOUNTER — PATIENT MESSAGE (OUTPATIENT)
Dept: RHEUMATOLOGY | Facility: CLINIC | Age: 56
End: 2021-09-23

## 2021-09-25 LAB
LA 2 SCREEN W REFLEX-IMP: NORMAL
SCREEN APTT: 32 SECONDS
SCREEN DRVVT: 35 SECONDS

## 2021-09-26 LAB
ALBUMIN SERPL ELPH-MCNC: 3.9 G/DL (ref 3.8–4.8)
ALBUMIN SERPL-MCNC: 3.9 G/DL (ref 3.6–5.1)
ALBUMIN/GLOB SERPL: 1.3 (CALC) (ref 1–2.5)
ALP SERPL-CCNC: 94 U/L (ref 37–153)
ALPHA1 GLOB SERPL ELPH-MCNC: 0.3 G/DL (ref 0.2–0.3)
ALPHA2 GLOB SERPL ELPH-MCNC: 0.7 G/DL (ref 0.5–0.9)
ALT SERPL-CCNC: 16 U/L (ref 6–29)
ANA PAT SER IF-IMP: ABNORMAL
ANA SER QL IF: POSITIVE
ANA TITR SER IF: ABNORMAL TITER
AST SERPL-CCNC: 18 U/L (ref 10–35)
B2 GLYCOPROT1 IGA SER-ACNC: <2 U/ML
B2 GLYCOPROT1 IGA SER-ACNC: <2 U/ML
B2 GLYCOPROT1 IGG SER-ACNC: <2 U/ML
B2 GLYCOPROT1 IGG SER-ACNC: <2 U/ML
B2 GLYCOPROT1 IGM SER-ACNC: <2 U/ML
B2 GLYCOPROT1 IGM SER-ACNC: <2 U/ML
BASOPHILS # BLD AUTO: 73 CELLS/UL (ref 0–200)
BASOPHILS NFR BLD AUTO: 0.9 %
BETA1 GLOB SERPL ELPH-MCNC: 0.5 G/DL (ref 0.4–0.6)
BETA2 GLOB SERPL ELPH-MCNC: 0.5 G/DL (ref 0.2–0.5)
BILIRUB SERPL-MCNC: 0.3 MG/DL (ref 0.2–1.2)
BUN SERPL-MCNC: 15 MG/DL (ref 7–25)
BUN/CREAT SERPL: ABNORMAL (CALC) (ref 6–22)
CALCIUM SERPL-MCNC: 9.2 MG/DL (ref 8.6–10.4)
CARDIOLIPIN IGA SER IA-ACNC: <2 APL-U/ML
CARDIOLIPIN IGG SER IA-ACNC: <2 GPL-U/ML
CARDIOLIPIN IGM SER IA-ACNC: <2 MPL-U/ML
CCP IGG SERPL-ACNC: >250 UNITS
CHLORIDE SERPL-SCNC: 102 MMOL/L (ref 98–110)
CO2 SERPL-SCNC: 26 MMOL/L (ref 20–32)
CREAT SERPL-MCNC: 0.71 MG/DL (ref 0.5–1.05)
CRP SERPL-MCNC: 11.5 MG/L
EOSINOPHIL # BLD AUTO: 332 CELLS/UL (ref 15–500)
EOSINOPHIL NFR BLD AUTO: 4.1 %
ERYTHROCYTE [DISTWIDTH] IN BLOOD BY AUTOMATED COUNT: 12.5 % (ref 11–15)
ERYTHROCYTE [SEDIMENTATION RATE] IN BLOOD BY WESTERGREN METHOD: 9 MM/H
GAMMA GLOB SERPL ELPH-MCNC: 1 G/DL (ref 0.8–1.7)
GAMMA INTERFERON BACKGROUND BLD IA-ACNC: 0.01 IU/ML
GLOBULIN SER CALC-MCNC: 3 G/DL (CALC) (ref 1.9–3.7)
GLUCOSE SERPL-MCNC: 108 MG/DL (ref 65–99)
HBV CORE AB SERPL QL IA: NORMAL
HBV SURFACE AG SERPL QL IA: NORMAL
HCT VFR BLD AUTO: 44.2 % (ref 35–45)
HCV AB S/CO SERPL IA: 0.01
HCV AB SERPL QL IA: NORMAL
HGB BLD-MCNC: 14.1 G/DL (ref 11.7–15.5)
HIV 1+2 AB+HIV1 P24 AG SERPL QL IA: NORMAL
LYMPHOCYTES # BLD AUTO: 1685 CELLS/UL (ref 850–3900)
LYMPHOCYTES NFR BLD AUTO: 20.8 %
M TB IFN-G BLD-IMP: NEGATIVE
M TB IFN-G CD4+ BCKGRND COR BLD-ACNC: 0 IU/ML
MCH RBC QN AUTO: 28.8 PG (ref 27–33)
MCHC RBC AUTO-ENTMCNC: 31.9 G/DL (ref 32–36)
MCV RBC AUTO: 90.4 FL (ref 80–100)
MITOGEN IGNF BCKGRD COR BLD-ACNC: >10 IU/ML
MONOCYTES # BLD AUTO: 794 CELLS/UL (ref 200–950)
MONOCYTES NFR BLD AUTO: 9.8 %
NEUTROPHILS # BLD AUTO: 5216 CELLS/UL (ref 1500–7800)
NEUTROPHILS NFR BLD AUTO: 64.4 %
PLATELET # BLD AUTO: 356 THOUSAND/UL (ref 140–400)
PMV BLD REES-ECKER: 10.4 FL (ref 7.5–12.5)
POTASSIUM SERPL-SCNC: 4.5 MMOL/L (ref 3.5–5.3)
PROT PATTERN SERPL ELPH-IMP: NORMAL
PROT SERPL-MCNC: 6.9 G/DL (ref 6.1–8.1)
PROT SERPL-MCNC: 7 G/DL (ref 6.1–8.1)
PS IGA SER-ACNC: <20 U/ML
PS IGG SER-ACNC: <10 U/ML
PS IGM SER-ACNC: <25 U/ML
RBC # BLD AUTO: 4.89 MILLION/UL (ref 3.8–5.1)
RHEUMATOID FACT SERPL-ACNC: 63 IU/ML
SERVICE CMNT-IMP: NORMAL
SODIUM SERPL-SCNC: 136 MMOL/L (ref 135–146)
TB2 - NIL: 0 IU/ML
WBC # BLD AUTO: 8.1 THOUSAND/UL (ref 3.8–10.8)

## 2021-10-07 ENCOUNTER — PATIENT MESSAGE (OUTPATIENT)
Dept: ADMINISTRATIVE | Facility: HOSPITAL | Age: 56
End: 2021-10-07

## 2022-01-27 DIAGNOSIS — E04.2 NONTOXIC MULTINODULAR GOITER: Primary | ICD-10-CM

## 2022-01-27 DIAGNOSIS — M06.9 ARTHRITIS, RHEUMATOID: ICD-10-CM

## 2022-01-27 DIAGNOSIS — Z78.0 ASYMPTOMATIC MENOPAUSAL STATE: ICD-10-CM

## 2022-01-27 DIAGNOSIS — Z13.820 SPECIAL SCREENING FOR OSTEOPOROSIS: ICD-10-CM

## 2022-03-10 ENCOUNTER — HOSPITAL ENCOUNTER (OUTPATIENT)
Dept: RADIOLOGY | Facility: HOSPITAL | Age: 57
Discharge: HOME | End: 2022-03-10
Attending: INTERNAL MEDICINE
Payer: COMMERCIAL

## 2022-03-10 ENCOUNTER — PATIENT MESSAGE (OUTPATIENT)
Dept: RHEUMATOLOGY | Facility: CLINIC | Age: 57
End: 2022-03-10
Payer: COMMERCIAL

## 2022-03-10 DIAGNOSIS — E04.2 NONTOXIC MULTINODULAR GOITER: ICD-10-CM

## 2022-03-10 DIAGNOSIS — Z13.820 SPECIAL SCREENING FOR OSTEOPOROSIS: ICD-10-CM

## 2022-03-10 DIAGNOSIS — Z78.0 ASYMPTOMATIC MENOPAUSAL STATE: ICD-10-CM

## 2022-03-10 DIAGNOSIS — M06.9 ARTHRITIS, RHEUMATOID: ICD-10-CM

## 2022-03-10 PROCEDURE — 77080 DXA BONE DENSITY AXIAL: CPT | Mod: TC,PO

## 2022-03-10 PROCEDURE — 76536 US EXAM OF HEAD AND NECK: CPT | Mod: TC,PO

## 2023-02-22 ENCOUNTER — TELEPHONE (OUTPATIENT)
Dept: FAMILY MEDICINE | Facility: CLINIC | Age: 58
End: 2023-02-22
Payer: COMMERCIAL

## 2023-02-22 NOTE — TELEPHONE ENCOUNTER
Returned call to Justin at [facility] and advised patient has not been seen in our clinic in over 2 years and needing to know which images she is referring to. Left voicemail to return call.

## 2023-02-22 NOTE — TELEPHONE ENCOUNTER
----- Message from Sudhir Richard sent at 2/22/2023 10:01 AM CST -----  Who Called: [facility]          What is the reqeust in detail: Requesting call back to discuss images from mammogram done may 4, 202 sent over.            Can the clinic reply by [facility]? No          Best Call Back Number: [phone]         Additional Information:

## 2023-08-27 ENCOUNTER — ON-DEMAND VIRTUAL (OUTPATIENT)
Dept: URGENT CARE | Facility: CLINIC | Age: 58
End: 2023-08-27
Payer: COMMERCIAL

## 2024-12-31 ENCOUNTER — OFFICE VISIT (OUTPATIENT)
Dept: URGENT CARE | Facility: CLINIC | Age: 59
End: 2024-12-31
Payer: COMMERCIAL

## 2024-12-31 VITALS
SYSTOLIC BLOOD PRESSURE: 124 MMHG | HEIGHT: 67 IN | RESPIRATION RATE: 18 BRPM | OXYGEN SATURATION: 95 % | WEIGHT: 270 LBS | DIASTOLIC BLOOD PRESSURE: 80 MMHG | HEART RATE: 87 BPM | BODY MASS INDEX: 42.38 KG/M2 | TEMPERATURE: 98 F

## 2024-12-31 DIAGNOSIS — J01.90 ACUTE SINUSITIS, RECURRENCE NOT SPECIFIED, UNSPECIFIED LOCATION: Primary | ICD-10-CM

## 2024-12-31 DIAGNOSIS — J20.9 ACUTE BRONCHITIS, UNSPECIFIED ORGANISM: ICD-10-CM

## 2024-12-31 DIAGNOSIS — J02.9 SORE THROAT: ICD-10-CM

## 2024-12-31 LAB
CTP QC/QA: YES
MOLECULAR STREP A: NEGATIVE

## 2024-12-31 PROCEDURE — 99203 OFFICE O/P NEW LOW 30 MIN: CPT | Mod: 25,,, | Performed by: PHYSICIAN ASSISTANT

## 2024-12-31 PROCEDURE — 87651 STREP A DNA AMP PROBE: CPT | Mod: QW,,, | Performed by: PHYSICIAN ASSISTANT

## 2024-12-31 PROCEDURE — 96372 THER/PROPH/DIAG INJ SC/IM: CPT | Mod: ,,, | Performed by: PHYSICIAN ASSISTANT

## 2024-12-31 RX ORDER — PREDNISONE 10 MG/1
10 TABLET ORAL 2 TIMES DAILY
Qty: 10 TABLET | Refills: 0 | Status: SHIPPED | OUTPATIENT
Start: 2024-12-31 | End: 2025-01-05

## 2024-12-31 RX ORDER — PROMETHAZINE HYDROCHLORIDE AND DEXTROMETHORPHAN HYDROBROMIDE 6.25; 15 MG/5ML; MG/5ML
5 SYRUP ORAL EVERY 6 HOURS PRN
Qty: 118 ML | Refills: 0 | Status: SHIPPED | OUTPATIENT
Start: 2024-12-31 | End: 2025-01-10

## 2024-12-31 RX ORDER — DOXYCYCLINE 100 MG/1
100 CAPSULE ORAL EVERY 12 HOURS
Qty: 20 CAPSULE | Refills: 0 | Status: SHIPPED | OUTPATIENT
Start: 2024-12-31 | End: 2025-01-10

## 2024-12-31 RX ORDER — DEXAMETHASONE SODIUM PHOSPHATE 10 MG/ML
10 INJECTION INTRAMUSCULAR; INTRAVENOUS
Status: COMPLETED | OUTPATIENT
Start: 2024-12-31 | End: 2024-12-31

## 2024-12-31 RX ADMIN — DEXAMETHASONE SODIUM PHOSPHATE 10 MG: 10 INJECTION INTRAMUSCULAR; INTRAVENOUS at 02:12

## 2024-12-31 NOTE — PATIENT INSTRUCTIONS
Start prednisone in 36 hours.     Drink plenty of fluids.     Get plenty of rest.     Tylenol or Motrin as needed.     Go to the ER with any significant change or worsening of symptoms.     Follow up with your primary care doctor.

## 2024-12-31 NOTE — PROGRESS NOTES
"Subjective:      Patient ID: Suki Galvan is a 59 y.o. female.    Vitals:  height is 5' 7" (1.702 m) and weight is 122.5 kg (270 lb). Her oral temperature is 98.2 °F (36.8 °C). Her blood pressure is 124/80 and her pulse is 87. Her respiration is 18 and oxygen saturation is 95%.     Chief Complaint: Sore Throat     Patient is a 59 y.o. female who presents to urgent care with complaints of coughing up green mucus , sore throat and left ear pain  x2 weeks.  She also reports a blistering rash on the lower back that was painful starting a proximally 2 weeks ago.  She states that the lesions have mostly crusted over and she attributed this to a likely episode of shingles.  Alleviating factors include Tylenol, Albuterol inhaler and Motrin  with no relief.   Over the last few days she has noticed a nodule developed in the middle of her tongue.      ROS   Objective:     Physical Exam   Constitutional: She is oriented to person, place, and time. She appears well-developed. She is cooperative.  Non-toxic appearance. She does not appear ill. No distress.   HENT:   Head: Normocephalic and atraumatic.   Ears:   Right Ear: Hearing, tympanic membrane, external ear and ear canal normal.   Left Ear: Hearing, tympanic membrane, external ear and ear canal normal.   Nose: Nose normal. No nasal deformity. No epistaxis.   Mouth/Throat: Uvula is midline, oropharynx is clear and moist and mucous membranes are normal. No trismus in the jaw. Normal dentition. No uvula swelling. No oropharyngeal exudate, posterior oropharyngeal edema or posterior oropharyngeal erythema.   Eyes: Conjunctivae and lids are normal. No scleral icterus.   Neck: Trachea normal and phonation normal. Neck supple. No edema present. No erythema present. No neck rigidity present.   Cardiovascular: Normal rate, regular rhythm, normal heart sounds and normal pulses.   Pulmonary/Chest: Effort normal and breath sounds normal. No respiratory distress. She has no " decreased breath sounds. She has no rhonchi.   Abdominal: Normal appearance.   Musculoskeletal: Normal range of motion.         General: No deformity. Normal range of motion.   Neurological: She is alert and oriented to person, place, and time. She exhibits normal muscle tone. Coordination normal.   Skin: Skin is warm, dry, intact, not diaphoretic, not pale and rash.   Psychiatric: Her speech is normal and behavior is normal. Judgment and thought content normal.   Nursing note and vitals reviewed.    Clustered area of skin lesions that appear to be crusted over with mild surrounding erythema noted in the lower back.  This appears consistent with a resolving case of shingles.    It was an approximately 1 cm nodule palpable in the tongue.  Does not appear fluctuant.  No surrounding erythema that appears abnormal on the tongue.  I instructed the patient to monitor this area over the next few days.  If symptoms persist she was instructed to follow up with a dentist and an ENT.         Previous History      Review of patient's allergies indicates:   Allergen Reactions    Latex, natural rubber Itching       Past Medical History:   Diagnosis Date    Anxiety     Arthritis     Depression     28 years    Kidney stones     Multiple thyroid nodules     Prediabetes      Current Outpatient Medications   Medication Instructions    albuterol (PROVENTIL/VENTOLIN HFA) 90 mcg/actuation inhaler 2 puffs, Inhalation, Every 6 hours PRN, Rescue    azithromycin (Z-MARYCRUZ) 250 MG tablet Take two tablets on day one by mouth, then one tablet by mouth on days 2-5    buPROPion (WELLBUTRIN SR) 100 MG TBSR 12 hr tablet No dose, route, or frequency recorded.    buPROPion (WELLBUTRIN XL) 150 MG TB24 tablet No dose, route, or frequency recorded.    calcium carbonate/vitamin D3 (VITAMIN D-3 ORAL) 50 mg    clonazePAM (KLONOPIN) 1 MG tablet No dose, route, or frequency recorded.    cyanocobalamin 500 mcg, Oral, Daily    dexchlorphen-phenylephrine-DM 1-5-10  "mg/5 mL Syrp 5 mLs, Oral, Every 4 hours PRN    dextroamphetamine (DEXTROSTAT) 10 MG tablet No dose, route, or frequency recorded.    dextroamphetamine-amphetamine (ADDERALL) 20 mg tablet 1 tablet, Oral, 2 times daily    doxycycline (MONODOX) 100 mg, Oral, Every 12 hours    fluocinonide (LIDEX) 0.05 % external solution Topical (Top), 2 times daily, Prn rash scalp.    liothyronine (CYTOMEL) 10 mcg, Oral, Daily    metFORMIN (GLUCOPHAGE) 500 mg, Oral, 2 times daily with meals    predniSONE (DELTASONE) 10 mg, Oral, 2 times daily    promethazine (PHENERGAN) 12.5 mg, Oral, 4 times daily    promethazine-dextromethorphan (PROMETHAZINE-DM) 6.25-15 mg/5 mL Syrp 5 mLs, Oral, Every 6 hours PRN    sertraline (ZOLOFT) 200 mg, Oral, Daily, At bedtime    traZODone (DESYREL) 50 MG tablet TAKE 1 TABLET BY MOUTH EVERY EVENING.    triamcinolone acetonide 0.1% (KENALOG) 0.1 % Lotn Topical (Top), 2 times daily     Past Surgical History:   Procedure Laterality Date    CERVICAL CONIZATION   W/ LASER      15 years ago    TONSILLECTOMY       Family History   Problem Relation Name Age of Onset    Diabetes Mother      Stroke Mother      Dementia Father      Alzheimer's disease Father      COPD Sister      Parkinsonism Sister      Coronary artery disease Brother X2     Diabetes Brother X2     Diabetes Maternal Grandmother      Breast cancer Maternal Aunt         Social History     Tobacco Use    Smoking status: Some Days     Current packs/day: 1.00     Average packs/day: 1 pack/day for 30.0 years (30.0 ttl pk-yrs)     Types: Cigarettes    Smokeless tobacco: Never   Substance Use Topics    Alcohol use: No    Drug use: No        Physical Exam      Vital Signs Reviewed   /80   Pulse 87   Temp 98.2 °F (36.8 °C) (Oral)   Resp 18   Ht 5' 7" (1.702 m)   Wt 122.5 kg (270 lb)   SpO2 95%   BMI 42.29 kg/m²        Procedures    Procedures     Labs     Results for orders placed or performed in visit on 12/31/24   POCT Strep A, Molecular    " Collection Time: 12/31/24  1:40 PM   Result Value Ref Range    Molecular Strep A, POC Negative Negative     Acceptable Yes      Assessment:     1. Acute sinusitis, recurrence not specified, unspecified location    2. Sore throat    3. Acute bronchitis, unspecified organism        Plan:       Acute sinusitis, recurrence not specified, unspecified location    Sore throat  -     POCT Strep A, Molecular    Acute bronchitis, unspecified organism    Other orders  -     dexAMETHasone injection 10 mg  -     doxycycline (MONODOX) 100 MG capsule; Take 1 capsule (100 mg total) by mouth every 12 (twelve) hours. for 10 days  Dispense: 20 capsule; Refill: 0  -     predniSONE (DELTASONE) 10 MG tablet; Take 1 tablet (10 mg total) by mouth 2 (two) times daily. for 5 days  Dispense: 10 tablet; Refill: 0  -     promethazine-dextromethorphan (PROMETHAZINE-DM) 6.25-15 mg/5 mL Syrp; Take 5 mLs by mouth every 6 (six) hours as needed.  Dispense: 118 mL; Refill: 0      Start prednisone in 36 hours.     Drink plenty of fluids.     Get plenty of rest.     Tylenol or Motrin as needed.     Go to the ER with any significant change or worsening of symptoms.     Follow up with your primary care doctor.